# Patient Record
Sex: MALE | Race: BLACK OR AFRICAN AMERICAN | NOT HISPANIC OR LATINO | Employment: PART TIME | URBAN - METROPOLITAN AREA
[De-identification: names, ages, dates, MRNs, and addresses within clinical notes are randomized per-mention and may not be internally consistent; named-entity substitution may affect disease eponyms.]

---

## 2022-01-04 ENCOUNTER — IMMUNIZATION (OUTPATIENT)
Dept: PHARMACY | Facility: CLINIC | Age: 27
End: 2022-01-04
Payer: COMMERCIAL

## 2022-01-04 DIAGNOSIS — Z23 NEED FOR VACCINATION: Primary | ICD-10-CM

## 2022-03-04 ENCOUNTER — IMMUNIZATION (OUTPATIENT)
Dept: INTERNAL MEDICINE | Facility: CLINIC | Age: 27
End: 2022-03-04
Payer: COMMERCIAL

## 2022-03-04 PROCEDURE — 90686 IIV4 VACC NO PRSV 0.5 ML IM: CPT | Mod: S$GLB,,, | Performed by: PEDIATRICS

## 2022-03-04 PROCEDURE — 90471 FLU VACCINE (QUAD) GREATER THAN OR EQUAL TO 3YO PRESERVATIVE FREE IM: ICD-10-PCS | Mod: S$GLB,,, | Performed by: PEDIATRICS

## 2022-03-04 PROCEDURE — 90471 IMMUNIZATION ADMIN: CPT | Mod: S$GLB,,, | Performed by: PEDIATRICS

## 2022-03-04 PROCEDURE — 90686 FLU VACCINE (QUAD) GREATER THAN OR EQUAL TO 3YO PRESERVATIVE FREE IM: ICD-10-PCS | Mod: S$GLB,,, | Performed by: PEDIATRICS

## 2022-05-04 ENCOUNTER — HOSPITAL ENCOUNTER (OUTPATIENT)
Dept: RADIOLOGY | Facility: HOSPITAL | Age: 27
Discharge: HOME | End: 2022-05-04
Attending: FAMILY MEDICINE
Payer: COMMERCIAL

## 2022-05-04 ENCOUNTER — OFFICE VISIT (OUTPATIENT)
Dept: INTERNAL MEDICINE | Facility: CLINIC | Age: 27
End: 2022-05-04
Payer: COMMERCIAL

## 2022-05-04 VITALS
TEMPERATURE: 99 F | OXYGEN SATURATION: 99 % | HEIGHT: 72 IN | SYSTOLIC BLOOD PRESSURE: 142 MMHG | RESPIRATION RATE: 18 BRPM | HEART RATE: 99 BPM | DIASTOLIC BLOOD PRESSURE: 98 MMHG | WEIGHT: 315 LBS | BODY MASS INDEX: 42.66 KG/M2

## 2022-05-04 DIAGNOSIS — R23.8 OTHER SKIN CHANGES: ICD-10-CM

## 2022-05-04 DIAGNOSIS — F41.9 ANXIETY AND DEPRESSION: ICD-10-CM

## 2022-05-04 DIAGNOSIS — Z87.898 HISTORY OF PREDIABETES: ICD-10-CM

## 2022-05-04 DIAGNOSIS — Z00.00 ROUTINE PHYSICAL EXAMINATION: ICD-10-CM

## 2022-05-04 DIAGNOSIS — Z11.59 ENCOUNTER FOR HEPATITIS C SCREENING TEST FOR LOW RISK PATIENT: ICD-10-CM

## 2022-05-04 DIAGNOSIS — Z23 NEED FOR HPV VACCINATION: ICD-10-CM

## 2022-05-04 DIAGNOSIS — F90.9 ATTENTION DEFICIT HYPERACTIVITY DISORDER (ADHD), UNSPECIFIED ADHD TYPE: ICD-10-CM

## 2022-05-04 DIAGNOSIS — Z76.89 ESTABLISHING CARE WITH NEW DOCTOR, ENCOUNTER FOR: ICD-10-CM

## 2022-05-04 DIAGNOSIS — F32.A ANXIETY AND DEPRESSION: ICD-10-CM

## 2022-05-04 DIAGNOSIS — M25.569 KNEE PAIN, UNSPECIFIED CHRONICITY, UNSPECIFIED LATERALITY: ICD-10-CM

## 2022-05-04 DIAGNOSIS — Z11.4 ENCOUNTER FOR SCREENING FOR HIV: ICD-10-CM

## 2022-05-04 DIAGNOSIS — R03.0 ELEVATED BLOOD PRESSURE READING: ICD-10-CM

## 2022-05-04 DIAGNOSIS — M25.569 KNEE PAIN, UNSPECIFIED CHRONICITY, UNSPECIFIED LATERALITY: Primary | ICD-10-CM

## 2022-05-04 PROBLEM — J45.20 MILD INTERMITTENT ASTHMA, UNCOMPLICATED: Status: ACTIVE | Noted: 2022-05-04

## 2022-05-04 PROBLEM — F90.0 ATTENTION DEFICIT HYPERACTIVITY DISORDER (ADHD), PREDOMINANTLY INATTENTIVE TYPE: Status: ACTIVE | Noted: 2022-05-04

## 2022-05-04 PROCEDURE — 3008F PR BODY MASS INDEX (BMI) DOCUMENTED: ICD-10-PCS | Mod: CPTII,S$GLB,, | Performed by: FAMILY MEDICINE

## 2022-05-04 PROCEDURE — 73562 XR KNEE ORTHO BILAT: ICD-10-PCS | Mod: 26,50,, | Performed by: RADIOLOGY

## 2022-05-04 PROCEDURE — 3008F BODY MASS INDEX DOCD: CPT | Mod: CPTII,S$GLB,, | Performed by: FAMILY MEDICINE

## 2022-05-04 PROCEDURE — 90651 HPV VACCINE 9-VALENT 3 DOSE IM: ICD-10-PCS | Mod: S$GLB,,, | Performed by: FAMILY MEDICINE

## 2022-05-04 PROCEDURE — 99204 PR OFFICE/OUTPT VISIT, NEW, LEVL IV, 45-59 MIN: ICD-10-PCS | Mod: 25,S$GLB,, | Performed by: FAMILY MEDICINE

## 2022-05-04 PROCEDURE — 1159F PR MEDICATION LIST DOCUMENTED IN MEDICAL RECORD: ICD-10-PCS | Mod: CPTII,S$GLB,, | Performed by: FAMILY MEDICINE

## 2022-05-04 PROCEDURE — 73562 X-RAY EXAM OF KNEE 3: CPT | Mod: 26,50,, | Performed by: RADIOLOGY

## 2022-05-04 PROCEDURE — 99204 OFFICE O/P NEW MOD 45 MIN: CPT | Mod: 25,S$GLB,, | Performed by: FAMILY MEDICINE

## 2022-05-04 PROCEDURE — 99999 PR PBB SHADOW E&M-EST. PATIENT-LVL V: ICD-10-PCS | Mod: PBBFAC,,, | Performed by: FAMILY MEDICINE

## 2022-05-04 PROCEDURE — 90471 IMMUNIZATION ADMIN: CPT | Mod: S$GLB,,, | Performed by: FAMILY MEDICINE

## 2022-05-04 PROCEDURE — 99999 PR PBB SHADOW E&M-EST. PATIENT-LVL V: CPT | Mod: PBBFAC,,, | Performed by: FAMILY MEDICINE

## 2022-05-04 PROCEDURE — 73562 X-RAY EXAM OF KNEE 3: CPT | Mod: TC,50

## 2022-05-04 PROCEDURE — 3044F HG A1C LEVEL LT 7.0%: CPT | Mod: CPTII,S$GLB,, | Performed by: FAMILY MEDICINE

## 2022-05-04 PROCEDURE — 1159F MED LIST DOCD IN RCRD: CPT | Mod: CPTII,S$GLB,, | Performed by: FAMILY MEDICINE

## 2022-05-04 PROCEDURE — 3044F PR MOST RECENT HEMOGLOBIN A1C LEVEL <7.0%: ICD-10-PCS | Mod: CPTII,S$GLB,, | Performed by: FAMILY MEDICINE

## 2022-05-04 PROCEDURE — 90471 HPV VACCINE 9-VALENT 3 DOSE IM: ICD-10-PCS | Mod: S$GLB,,, | Performed by: FAMILY MEDICINE

## 2022-05-04 PROCEDURE — 90651 9VHPV VACCINE 2/3 DOSE IM: CPT | Mod: S$GLB,,, | Performed by: FAMILY MEDICINE

## 2022-05-04 RX ORDER — ATOMOXETINE 40 MG/1
40 CAPSULE ORAL DAILY
Qty: 30 CAPSULE | Refills: 0 | Status: SHIPPED | OUTPATIENT
Start: 2022-05-04 | End: 2022-10-24

## 2022-05-04 NOTE — PROGRESS NOTES
Subjective:       Patient ID: Ernie Hunter is a 26 y.o. male.    Chief Complaint: Establish Care    HPI Mr. Hunter presents today to establish care. He has a few concerns today      Stopped taking medication for ADHD     Diagnosed in elementary school   Stopped taking the medication because he did not like the way it made him feel    Feels he needs something again  Loses focus a lot   Working has been affected     Torn ligaments in the right knee  Never had surgery     Pain in left knee     Always had jobs that required a lot of moving     Depression and anxiety  Had 2 anxiety attacks on last year    Oldest of 4     Skin break out when he gets hot  Itchy   But back feels like it is on fire  Cooling down makes it feel better     Review of Systems   Constitutional: Negative for activity change, appetite change, fatigue and fever.   HENT: Negative for congestion, ear pain, facial swelling, hearing loss, sore throat and tinnitus.    Eyes: Negative for redness and visual disturbance.   Respiratory: Negative for cough, chest tightness and wheezing.    Gastrointestinal: Negative for abdominal distention, abdominal pain, constipation, diarrhea, nausea and vomiting.   Endocrine: Negative for polydipsia and polyuria.   Genitourinary: Negative for flank pain, frequency and penile discharge.   Musculoskeletal: Negative for back pain, gait problem and joint swelling.   Skin: Negative for rash.   Neurological: Negative for dizziness, tremors, seizures, weakness and headaches.   Psychiatric/Behavioral: Negative for agitation and confusion.           Past Medical History:   Diagnosis Date    Attention deficit hyperactivity disorder (ADHD), predominantly inattentive type     Mild intermittent asthma, uncomplicated     Prediabetes      History reviewed. No pertinent surgical history.  Family History   Problem Relation Age of Onset    Hypertension Mother     Migraines Mother     Depression Mother     Anxiety disorder  Mother     Hyperlipidemia Father     Hypertension Father     Heart attack Father         prior to 50    Heart failure Maternal Grandmother     Hypertension Maternal Grandmother     Hyperlipidemia Maternal Grandmother     Diabetes Maternal Grandmother     Diabetes Paternal Grandfather     Hyperlipidemia Paternal Grandfather     Hypertension Paternal Grandfather     Arthritis Paternal Grandfather     Gout Paternal Grandfather      Social History     Socioeconomic History    Marital status: Single   Tobacco Use    Smoking status: Never Smoker    Smokeless tobacco: Never Used   Substance and Sexual Activity    Alcohol use: Yes       Objective:        Physical Exam  Vitals reviewed.   Constitutional:       Appearance: He is obese. He is not ill-appearing.   HENT:      Head: Normocephalic and atraumatic.      Right Ear: External ear normal.      Left Ear: External ear normal.   Eyes:      General: No scleral icterus.        Right eye: No discharge.         Left eye: No discharge.      Pupils: Pupils are equal, round, and reactive to light.   Neck:      Thyroid: No thyromegaly.   Cardiovascular:      Rate and Rhythm: Normal rate and regular rhythm.      Heart sounds: Normal heart sounds. No murmur heard.  Pulmonary:      Effort: Pulmonary effort is normal. No respiratory distress.      Breath sounds: Normal breath sounds. No wheezing.   Abdominal:      General: Bowel sounds are normal. There is no distension.      Palpations: Abdomen is soft.      Tenderness: There is no abdominal tenderness.   Musculoskeletal:         General: Normal range of motion.      Cervical back: Normal range of motion and neck supple.   Skin:     General: Skin is warm.      Findings: No erythema or rash.   Neurological:      Mental Status: He is alert and oriented to person, place, and time.      Coordination: Coordination normal.      Deep Tendon Reflexes: Reflexes are normal and symmetric.   Psychiatric:         Behavior:  Behavior normal.           No results found for this or any previous visit.    Assessment/Plan:     Knee pain, unspecified chronicity, unspecified laterality  -     Cancel: X-Ray Knee 1 or 2 View Bilateral; Future; Expected date: 05/04/2022  -     Ambulatory referral/consult to Orthopedics; Future; Expected date: 05/11/2022    History of prediabetes  -     Hemoglobin A1C; Future; Expected date: 05/04/2022    Attention deficit hyperactivity disorder (ADHD), unspecified ADHD type  -     Cancel: Ambulatory referral/consult to Psychiatry; Future; Expected date: 05/11/2022  -     atomoxetine (STRATTERA) 40 MG capsule; Take 1 capsule (40 mg total) by mouth once daily.  Dispense: 30 capsule; Refill: 0  -     Ambulatory referral/consult to Psychiatry; Future; Expected date: 05/11/2022    Anxiety and depression  -     Ambulatory referral/consult to Psychiatry; Future; Expected date: 05/11/2022    Elevated blood pressure reading    Other skin changes  -     Ambulatory referral/consult to Dermatology; Future; Expected date: 05/11/2022    Need for HPV vaccination  -     (In Office Administered) HPV Vaccine (9-Valent) (3 Dose) (IM)    Establishing care with new doctor, encounter for    Encounter for hepatitis C screening test for low risk patient  -     Hepatitis C Antibody; Future; Expected date: 05/04/2022    Encounter for screening for HIV  -     HIV 1/2 Ag/Ab (4th Gen); Future; Expected date: 05/04/2022    Routine physical examination  -     Lipid Panel; Future; Expected date: 05/04/2022  -     Comprehensive Metabolic Panel; Future; Expected date: 05/04/2022  -     CBC Auto Differential; Future; Expected date: 05/04/2022  -     TSH; Future; Expected date: 05/04/2022          Follow up 2 weeks BP     Saba Palomares MD  ON   Family Medicine

## 2022-05-05 ENCOUNTER — PATIENT MESSAGE (OUTPATIENT)
Dept: INTERNAL MEDICINE | Facility: CLINIC | Age: 27
End: 2022-05-05
Payer: COMMERCIAL

## 2022-05-05 RX ORDER — METFORMIN HYDROCHLORIDE 500 MG/1
500 TABLET, EXTENDED RELEASE ORAL
Qty: 90 TABLET | Refills: 1 | Status: SHIPPED | OUTPATIENT
Start: 2022-05-05 | End: 2023-05-10 | Stop reason: SDUPTHER

## 2022-05-19 ENCOUNTER — PATIENT MESSAGE (OUTPATIENT)
Dept: INTERNAL MEDICINE | Facility: CLINIC | Age: 27
End: 2022-05-19
Payer: COMMERCIAL

## 2022-05-19 ENCOUNTER — TELEPHONE (OUTPATIENT)
Dept: INTERNAL MEDICINE | Facility: CLINIC | Age: 27
End: 2022-05-19
Payer: COMMERCIAL

## 2022-05-19 DIAGNOSIS — M25.569 KNEE PAIN, UNSPECIFIED CHRONICITY, UNSPECIFIED LATERALITY: Primary | ICD-10-CM

## 2022-05-19 NOTE — TELEPHONE ENCOUNTER
----- Message from Maurizio Phillips sent at 5/19/2022  3:23 PM CDT -----  Contact: PT  Calling to give the Ortho providers to send a referral to. Provider name is Dr. Marie at the [facility] at [address] [place] [address]. Call back at [phone]

## 2022-05-27 ENCOUNTER — PATIENT MESSAGE (OUTPATIENT)
Dept: INTERNAL MEDICINE | Facility: CLINIC | Age: 27
End: 2022-05-27
Payer: COMMERCIAL

## 2022-05-31 ENCOUNTER — OFFICE VISIT (OUTPATIENT)
Dept: DERMATOLOGY | Facility: CLINIC | Age: 27
End: 2022-05-31
Payer: COMMERCIAL

## 2022-05-31 DIAGNOSIS — L73.9 FOLLICULITIS: ICD-10-CM

## 2022-05-31 DIAGNOSIS — L85.8 KERATOSIS PILARIS: Primary | ICD-10-CM

## 2022-05-31 DIAGNOSIS — R23.8 OTHER SKIN CHANGES: ICD-10-CM

## 2022-05-31 PROCEDURE — 1160F PR REVIEW ALL MEDS BY PRESCRIBER/CLIN PHARMACIST DOCUMENTED: ICD-10-PCS | Mod: CPTII,S$GLB,, | Performed by: STUDENT IN AN ORGANIZED HEALTH CARE EDUCATION/TRAINING PROGRAM

## 2022-05-31 PROCEDURE — 3044F PR MOST RECENT HEMOGLOBIN A1C LEVEL <7.0%: ICD-10-PCS | Mod: CPTII,S$GLB,, | Performed by: STUDENT IN AN ORGANIZED HEALTH CARE EDUCATION/TRAINING PROGRAM

## 2022-05-31 PROCEDURE — 1159F PR MEDICATION LIST DOCUMENTED IN MEDICAL RECORD: ICD-10-PCS | Mod: CPTII,S$GLB,, | Performed by: STUDENT IN AN ORGANIZED HEALTH CARE EDUCATION/TRAINING PROGRAM

## 2022-05-31 PROCEDURE — 1159F MED LIST DOCD IN RCRD: CPT | Mod: CPTII,S$GLB,, | Performed by: STUDENT IN AN ORGANIZED HEALTH CARE EDUCATION/TRAINING PROGRAM

## 2022-05-31 PROCEDURE — 99204 PR OFFICE/OUTPT VISIT, NEW, LEVL IV, 45-59 MIN: ICD-10-PCS | Mod: S$GLB,,, | Performed by: STUDENT IN AN ORGANIZED HEALTH CARE EDUCATION/TRAINING PROGRAM

## 2022-05-31 PROCEDURE — 1160F RVW MEDS BY RX/DR IN RCRD: CPT | Mod: CPTII,S$GLB,, | Performed by: STUDENT IN AN ORGANIZED HEALTH CARE EDUCATION/TRAINING PROGRAM

## 2022-05-31 PROCEDURE — 99999 PR PBB SHADOW E&M-EST. PATIENT-LVL III: CPT | Mod: PBBFAC,,, | Performed by: STUDENT IN AN ORGANIZED HEALTH CARE EDUCATION/TRAINING PROGRAM

## 2022-05-31 PROCEDURE — 99999 PR PBB SHADOW E&M-EST. PATIENT-LVL III: ICD-10-PCS | Mod: PBBFAC,,, | Performed by: STUDENT IN AN ORGANIZED HEALTH CARE EDUCATION/TRAINING PROGRAM

## 2022-05-31 PROCEDURE — 99204 OFFICE O/P NEW MOD 45 MIN: CPT | Mod: S$GLB,,, | Performed by: STUDENT IN AN ORGANIZED HEALTH CARE EDUCATION/TRAINING PROGRAM

## 2022-05-31 PROCEDURE — 3044F HG A1C LEVEL LT 7.0%: CPT | Mod: CPTII,S$GLB,, | Performed by: STUDENT IN AN ORGANIZED HEALTH CARE EDUCATION/TRAINING PROGRAM

## 2022-05-31 RX ORDER — TRIAMCINOLONE ACETONIDE 1 MG/G
CREAM TOPICAL
Qty: 454 G | Refills: 0 | Status: SHIPPED | OUTPATIENT
Start: 2022-05-31

## 2022-05-31 RX ORDER — UREA 40 %
CREAM (GRAM) TOPICAL DAILY
Qty: 85 G | Refills: 2 | Status: SHIPPED | OUTPATIENT
Start: 2022-05-31

## 2022-05-31 RX ORDER — DOXYCYCLINE HYCLATE 100 MG
100 TABLET ORAL DAILY
Qty: 30 TABLET | Refills: 0 | Status: SHIPPED | OUTPATIENT
Start: 2022-05-31 | End: 2022-10-24

## 2022-05-31 NOTE — PROGRESS NOTES
Subjective:       Patient ID:  Ernie Hunter is a 26 y.o. male who presents for   Chief Complaint   Patient presents with    Skin Check     History of Present Illness: The patient presents with chief complaint of a rough, bumpy, rash on the skin.  Location: chest, arms, upper back  Duration: ongoing for months  Signs/Symptoms: rough, bumpy itchy skin in these areas. Also develops pus bumps and red bumps often  Prior treatments: none        Review of Systems   Constitutional: Negative for fever and chills.   Skin: Positive for itching and dry skin.        Objective:    Physical Exam   Constitutional: He appears well-developed and well-nourished. No distress.   Neurological: He is alert and oriented to person, place, and time. He is not disoriented.   Psychiatric: He has a normal mood and affect.   Skin:   Areas Examined (abnormalities noted in diagram):   Head / Face Inspection Performed  Neck Inspection Performed  Chest / Axilla Inspection Performed  Abdomen Inspection Performed  Back Inspection Performed  RUE Inspected  LUE Inspection Performed              Diagram Legend     Erythematous scaling macule/papule c/w actinic keratosis       Vascular papule c/w angioma      Pigmented verrucoid papule/plaque c/w seborrheic keratosis      Yellow umbilicated papule c/w sebaceous hyperplasia      Irregularly shaped tan macule c/w lentigo     1-2 mm smooth white papules consistent with Milia      Movable subcutaneous cyst with punctum c/w epidermal inclusion cyst      Subcutaneous movable cyst c/w pilar cyst      Firm pink to brown papule c/w dermatofibroma      Pedunculated fleshy papule(s) c/w skin tag(s)      Evenly pigmented macule c/w junctional nevus     Mildly variegated pigmented, slightly irregular-bordered macule c/w mildly atypical nevus      Flesh colored to evenly pigmented papule c/w intradermal nevus       Pink pearly papule/plaque c/w basal cell carcinoma      Erythematous hyperkeratotic cursted  plaque c/w SCC      Surgical scar with no sign of skin cancer recurrence      Open and closed comedones      Inflammatory papules and pustules      Verrucoid papule consistent consistent with wart     Erythematous eczematous patches and plaques     Dystrophic onycholytic nail with subungual debris c/w onychomycosis     Umbilicated papule    Erythematous-base heme-crusted tan verrucoid plaque consistent with inflamed seborrheic keratosis     Erythematous Silvery Scaling Plaque c/w Psoriasis     See annotation      Assessment / Plan:        Keratosis pilaris  -     triamcinolone acetonide 0.1% (KENALOG) 0.1 % cream; AAA bid  Dispense: 454 g; Refill: 0  -     urea (CARMOL) 40 % Crea; Apply topically once daily.  Dispense: 85 g; Refill: 2  -     Counseled patient on gentle skin care regimen, including need for sensitive soaps/detergents, as well as need for frequent use of sensitize moisturizers.     Folliculitis  -     doxycycline (VIBRA-TABS) 100 MG tablet; Take 1 tablet (100 mg total) by mouth once daily.  Dispense: 30 tablet; Refill: 0             Follow up in about 8 weeks (around 7/26/2022).

## 2022-06-06 ENCOUNTER — OFFICE VISIT (OUTPATIENT)
Dept: PSYCHIATRY | Facility: CLINIC | Age: 27
End: 2022-06-06
Payer: COMMERCIAL

## 2022-06-06 ENCOUNTER — PATIENT MESSAGE (OUTPATIENT)
Dept: DERMATOLOGY | Facility: CLINIC | Age: 27
End: 2022-06-06
Payer: COMMERCIAL

## 2022-06-06 DIAGNOSIS — F32.A ANXIETY AND DEPRESSION: ICD-10-CM

## 2022-06-06 DIAGNOSIS — F41.9 ANXIETY AND DEPRESSION: ICD-10-CM

## 2022-06-06 PROCEDURE — 90832 PSYTX W PT 30 MINUTES: CPT | Mod: S$GLB,,, | Performed by: SOCIAL WORKER

## 2022-06-06 PROCEDURE — 3044F HG A1C LEVEL LT 7.0%: CPT | Mod: CPTII,S$GLB,, | Performed by: SOCIAL WORKER

## 2022-06-06 PROCEDURE — 99999 PR PBB SHADOW E&M-EST. PATIENT-LVL I: ICD-10-PCS | Mod: PBBFAC,,, | Performed by: SOCIAL WORKER

## 2022-06-06 PROCEDURE — 90832 PR PSYCHOTHERAPY W/PATIENT, 30 MIN: ICD-10-PCS | Mod: S$GLB,,, | Performed by: SOCIAL WORKER

## 2022-06-06 PROCEDURE — 3044F PR MOST RECENT HEMOGLOBIN A1C LEVEL <7.0%: ICD-10-PCS | Mod: CPTII,S$GLB,, | Performed by: SOCIAL WORKER

## 2022-06-06 PROCEDURE — 99999 PR PBB SHADOW E&M-EST. PATIENT-LVL I: CPT | Mod: PBBFAC,,, | Performed by: SOCIAL WORKER
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
## 2022-06-14 ENCOUNTER — PATIENT MESSAGE (OUTPATIENT)
Dept: INTERNAL MEDICINE | Facility: CLINIC | Age: 27
End: 2022-06-14
Payer: COMMERCIAL

## 2022-06-15 ENCOUNTER — OFFICE VISIT (OUTPATIENT)
Dept: INTERNAL MEDICINE | Facility: CLINIC | Age: 27
End: 2022-06-15
Payer: COMMERCIAL

## 2022-06-15 ENCOUNTER — PATIENT MESSAGE (OUTPATIENT)
Dept: INTERNAL MEDICINE | Facility: CLINIC | Age: 27
End: 2022-06-15

## 2022-06-15 DIAGNOSIS — F41.9 ANXIETY AND DEPRESSION: Primary | ICD-10-CM

## 2022-06-15 DIAGNOSIS — F32.A ANXIETY AND DEPRESSION: Primary | ICD-10-CM

## 2022-06-15 PROCEDURE — 3044F HG A1C LEVEL LT 7.0%: CPT | Mod: CPTII,95,, | Performed by: FAMILY MEDICINE

## 2022-06-15 PROCEDURE — 99213 OFFICE O/P EST LOW 20 MIN: CPT | Mod: 95,,, | Performed by: FAMILY MEDICINE

## 2022-06-15 PROCEDURE — 3044F PR MOST RECENT HEMOGLOBIN A1C LEVEL <7.0%: ICD-10-PCS | Mod: CPTII,95,, | Performed by: FAMILY MEDICINE

## 2022-06-15 PROCEDURE — 99213 PR OFFICE/OUTPT VISIT, EST, LEVL III, 20-29 MIN: ICD-10-PCS | Mod: 95,,, | Performed by: FAMILY MEDICINE

## 2022-06-15 NOTE — PROGRESS NOTES
The patient location is: [place] ([place])  The chief complaint leading to consultation is: follow up     Visit type: audiovisual    Face to Face time with patient: 14 minutes  14 minutes of total time spent on the encounter, which includes face to face time and non-face to face time preparing to see the patient (eg, review of tests), Obtaining and/or reviewing separately obtained history, Documenting clinical information in the electronic or other health record, Independently interpreting results (not separately reported) and communicating results to the patient/family/caregiver, or Care coordination (not separately reported).         Each patient to whom he or she provides medical services by telemedicine is:  (1) informed of the relationship between the physician and patient and the respective role of any other health care provider with respect to management of the patient; and (2) notified that he or she may decline to receive medical services by telemedicine and may withdraw from such care at any time.    Notes:   Subjective:       Patient ID: Ernie Hunter is a 26 y.o. male.    Chief Complaint: No chief complaint on file.    HPI Mr. Hunter presents today via telemedicine for follow up.     Previous appt was to establish care  Anxiety addressed   Referral to psychiatry made at that time   He has had a visit with Omar in the psych department   He would like to also see an MD     Attention deficit addressed   STrattera was prescribed he did not see a difference after 15 days 80 mg   He is scheduled in July 8th     Review of Systems   Constitutional: Negative for activity change.   HENT: Negative for hearing loss and trouble swallowing.    Eyes: Negative for discharge.   Respiratory: Negative for chest tightness and wheezing.    Cardiovascular: Negative for chest pain and palpitations.   Gastrointestinal: Negative for constipation, diarrhea and vomiting.   Genitourinary: Negative for difficulty urinating  and hematuria.   Neurological: Positive for headaches. Negative for weakness.   Psychiatric/Behavioral: Positive for dysphoric mood. Negative for confusion.           Past Medical History:   Diagnosis Date    Attention deficit hyperactivity disorder (ADHD), predominantly inattentive type     Mild intermittent asthma, uncomplicated     Prediabetes      No past surgical history on file.  Family History   Problem Relation Age of Onset    Hypertension Mother     Migraines Mother     Depression Mother     Anxiety disorder Mother     Hyperlipidemia Father     Hypertension Father     Heart attack Father         prior to 50    Heart failure Maternal Grandmother     Hypertension Maternal Grandmother     Hyperlipidemia Maternal Grandmother     Diabetes Maternal Grandmother     Diabetes Paternal Grandfather     Hyperlipidemia Paternal Grandfather     Hypertension Paternal Grandfather     Arthritis Paternal Grandfather     Gout Paternal Grandfather      Social History     Socioeconomic History    Marital status: Single   Tobacco Use    Smoking status: Never Smoker    Smokeless tobacco: Never Used   Substance and Sexual Activity    Alcohol use: Yes       Objective:        Physical Exam  Constitutional:       Appearance: Normal appearance. He is obese.   HENT:      Head: Normocephalic and atraumatic.   Pulmonary:      Effort: Pulmonary effort is normal.   Neurological:      Mental Status: He is alert and oriented to person, place, and time.           Results for orders placed or performed in visit on 05/04/22   Lipid Panel   Result Value Ref Range    Cholesterol 172 120 - 199 mg/dL    Triglycerides 44 30 - 150 mg/dL    HDL 38 (L) 40 - 75 mg/dL    LDL Cholesterol 125.2 63.0 - 159.0 mg/dL    HDL/Cholesterol Ratio 22.1 20.0 - 50.0 %    Total Cholesterol/HDL Ratio 4.5 2.0 - 5.0    Non-HDL Cholesterol 134 mg/dL   Comprehensive Metabolic Panel   Result Value Ref Range    Sodium 140 136 - 145 mmol/L    Potassium  4.2 3.5 - 5.1 mmol/L    Chloride 106 95 - 110 mmol/L    CO2 26 23 - 29 mmol/L    Glucose 102 70 - 110 mg/dL    BUN 8 6 - 20 mg/dL    Creatinine 0.9 0.5 - 1.4 mg/dL    Calcium 9.9 8.7 - 10.5 mg/dL    Total Protein 7.2 6.0 - 8.4 g/dL    Albumin 4.1 3.5 - 5.2 g/dL    Total Bilirubin 1.0 0.1 - 1.0 mg/dL    Alkaline Phosphatase 71 55 - 135 U/L    AST 23 10 - 40 U/L    ALT 36 10 - 44 U/L    Anion Gap 8 8 - 16 mmol/L    eGFR if African American >60.0 >60 mL/min/1.73 m^2    eGFR if non African American >60.0 >60 mL/min/1.73 m^2   CBC Auto Differential   Result Value Ref Range    WBC 5.26 3.90 - 12.70 K/uL    RBC 5.72 4.60 - 6.20 M/uL    Hemoglobin 16.4 14.0 - 18.0 g/dL    Hematocrit 48.8 40.0 - 54.0 %    MCV 85 82 - 98 fL    MCH 28.7 27.0 - 31.0 pg    MCHC 33.6 32.0 - 36.0 g/dL    RDW 13.2 11.5 - 14.5 %    Platelets 303 150 - 450 K/uL    MPV 11.7 9.2 - 12.9 fL    Immature Granulocytes 0.2 0.0 - 0.5 %    Gran # (ANC) 2.7 1.8 - 7.7 K/uL    Immature Grans (Abs) 0.01 0.00 - 0.04 K/uL    Lymph # 1.8 1.0 - 4.8 K/uL    Mono # 0.6 0.3 - 1.0 K/uL    Eos # 0.2 0.0 - 0.5 K/uL    Baso # 0.03 0.00 - 0.20 K/uL    nRBC 0 0 /100 WBC    Gran % 52.0 38.0 - 73.0 %    Lymph % 33.3 18.0 - 48.0 %    Mono % 10.5 4.0 - 15.0 %    Eosinophil % 3.4 0.0 - 8.0 %    Basophil % 0.6 0.0 - 1.9 %    Differential Method Automated    TSH   Result Value Ref Range    TSH 1.493 0.400 - 4.000 uIU/mL   Hepatitis C Antibody   Result Value Ref Range    Hepatitis C Ab Negative Negative   HIV 1/2 Ag/Ab (4th Gen)   Result Value Ref Range    HIV 1/2 Ag/Ab Negative Negative   Hemoglobin A1C   Result Value Ref Range    Hemoglobin A1C 6.0 (H) 4.0 - 5.6 %    Estimated Avg Glucose 126 68 - 131 mg/dL       Assessment/Plan:     Anxiety and depression  -     Ambulatory referral/consult to Psychiatry; Future; Expected date: 06/22/2022      Check ortho external referral  Pt scheduled to see someone in July for evaluation for stimulant medication     Follow up as needed      Saba Palmoares MD  [facility]

## 2022-07-08 ENCOUNTER — OFFICE VISIT (OUTPATIENT)
Dept: PSYCHIATRY | Facility: CLINIC | Age: 27
End: 2022-07-08
Payer: COMMERCIAL

## 2022-07-08 DIAGNOSIS — F32.A ANXIETY AND DEPRESSION: Primary | ICD-10-CM

## 2022-07-08 DIAGNOSIS — F41.9 ANXIETY AND DEPRESSION: Primary | ICD-10-CM

## 2022-07-08 PROCEDURE — 3044F PR MOST RECENT HEMOGLOBIN A1C LEVEL <7.0%: ICD-10-PCS | Mod: CPTII,S$GLB,, | Performed by: SOCIAL WORKER

## 2022-07-08 PROCEDURE — 3044F HG A1C LEVEL LT 7.0%: CPT | Mod: CPTII,S$GLB,, | Performed by: SOCIAL WORKER

## 2022-07-08 PROCEDURE — 90834 PSYTX W PT 45 MINUTES: CPT | Mod: S$GLB,,, | Performed by: SOCIAL WORKER

## 2022-07-08 PROCEDURE — 90834 PR PSYCHOTHERAPY W/PATIENT, 45 MIN: ICD-10-PCS | Mod: S$GLB,,, | Performed by: SOCIAL WORKER

## 2022-07-13 ENCOUNTER — OFFICE VISIT (OUTPATIENT)
Dept: PSYCHIATRY | Facility: CLINIC | Age: 27
End: 2022-07-13
Payer: COMMERCIAL

## 2022-07-13 DIAGNOSIS — F41.9 ANXIETY AND DEPRESSION: Primary | ICD-10-CM

## 2022-07-13 DIAGNOSIS — F32.A ANXIETY AND DEPRESSION: Primary | ICD-10-CM

## 2022-07-13 PROCEDURE — 90834 PSYTX W PT 45 MINUTES: CPT | Mod: S$GLB,,, | Performed by: SOCIAL WORKER

## 2022-07-13 PROCEDURE — 3044F PR MOST RECENT HEMOGLOBIN A1C LEVEL <7.0%: ICD-10-PCS | Mod: CPTII,S$GLB,, | Performed by: SOCIAL WORKER

## 2022-07-13 PROCEDURE — 90834 PR PSYCHOTHERAPY W/PATIENT, 45 MIN: ICD-10-PCS | Mod: S$GLB,,, | Performed by: SOCIAL WORKER

## 2022-07-13 PROCEDURE — 3044F HG A1C LEVEL LT 7.0%: CPT | Mod: CPTII,S$GLB,, | Performed by: SOCIAL WORKER
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
## 2022-07-26 ENCOUNTER — OFFICE VISIT (OUTPATIENT)
Dept: DERMATOLOGY | Facility: CLINIC | Age: 27
End: 2022-07-26
Payer: COMMERCIAL

## 2022-07-26 DIAGNOSIS — L85.8 KERATOSIS PILARIS: Primary | ICD-10-CM

## 2022-07-26 DIAGNOSIS — L73.9 FOLLICULITIS: ICD-10-CM

## 2022-07-26 PROCEDURE — 1160F RVW MEDS BY RX/DR IN RCRD: CPT | Mod: CPTII,S$GLB,, | Performed by: STUDENT IN AN ORGANIZED HEALTH CARE EDUCATION/TRAINING PROGRAM

## 2022-07-26 PROCEDURE — 3044F HG A1C LEVEL LT 7.0%: CPT | Mod: CPTII,S$GLB,, | Performed by: STUDENT IN AN ORGANIZED HEALTH CARE EDUCATION/TRAINING PROGRAM

## 2022-07-26 PROCEDURE — 99999 PR PBB SHADOW E&M-EST. PATIENT-LVL III: CPT | Mod: PBBFAC,,, | Performed by: STUDENT IN AN ORGANIZED HEALTH CARE EDUCATION/TRAINING PROGRAM

## 2022-07-26 PROCEDURE — 1159F MED LIST DOCD IN RCRD: CPT | Mod: CPTII,S$GLB,, | Performed by: STUDENT IN AN ORGANIZED HEALTH CARE EDUCATION/TRAINING PROGRAM

## 2022-07-26 PROCEDURE — 1159F PR MEDICATION LIST DOCUMENTED IN MEDICAL RECORD: ICD-10-PCS | Mod: CPTII,S$GLB,, | Performed by: STUDENT IN AN ORGANIZED HEALTH CARE EDUCATION/TRAINING PROGRAM

## 2022-07-26 PROCEDURE — 3044F PR MOST RECENT HEMOGLOBIN A1C LEVEL <7.0%: ICD-10-PCS | Mod: CPTII,S$GLB,, | Performed by: STUDENT IN AN ORGANIZED HEALTH CARE EDUCATION/TRAINING PROGRAM

## 2022-07-26 PROCEDURE — 1160F PR REVIEW ALL MEDS BY PRESCRIBER/CLIN PHARMACIST DOCUMENTED: ICD-10-PCS | Mod: CPTII,S$GLB,, | Performed by: STUDENT IN AN ORGANIZED HEALTH CARE EDUCATION/TRAINING PROGRAM

## 2022-07-26 PROCEDURE — 99213 PR OFFICE/OUTPT VISIT, EST, LEVL III, 20-29 MIN: ICD-10-PCS | Mod: S$GLB,,, | Performed by: STUDENT IN AN ORGANIZED HEALTH CARE EDUCATION/TRAINING PROGRAM

## 2022-07-26 PROCEDURE — 99213 OFFICE O/P EST LOW 20 MIN: CPT | Mod: S$GLB,,, | Performed by: STUDENT IN AN ORGANIZED HEALTH CARE EDUCATION/TRAINING PROGRAM

## 2022-07-26 PROCEDURE — 99999 PR PBB SHADOW E&M-EST. PATIENT-LVL III: ICD-10-PCS | Mod: PBBFAC,,, | Performed by: STUDENT IN AN ORGANIZED HEALTH CARE EDUCATION/TRAINING PROGRAM

## 2022-07-26 RX ORDER — UREA 40 %
CREAM (GRAM) TOPICAL 2 TIMES DAILY
Qty: 85 G | Refills: 0 | Status: SHIPPED | OUTPATIENT
Start: 2022-07-26

## 2022-07-26 NOTE — PROGRESS NOTES
Subjective:       Patient ID:  Ernie Hunter is a 27 y.o. male who presents for   Chief Complaint   Patient presents with    Follow-up     History of Present Illness: The patient presents for follow up of keratosis pilaris and folliculitis, last seen on 5/31/22 where he was started on trial of doxycycline, TAC and attempted to get urea cream. Patient only received doxycycline and TAC cream. Reports improvement with redness and pus bumps, as well as itching. Still does though report rough, bumpy skin on the arms and shoulders. No other concerning rash or skin lesions.         Review of Systems   Constitutional: Negative for fever and chills.        Objective:    Physical Exam   Constitutional: He appears well-developed and well-nourished. No distress.   Neurological: He is alert and oriented to person, place, and time. He is not disoriented.   Psychiatric: He has a normal mood and affect.   Skin:   Areas Examined (abnormalities noted in diagram):   Head / Face Inspection Performed  Neck Inspection Performed  Chest / Axilla Inspection Performed  Abdomen Inspection Performed  Back Inspection Performed  RUE Inspected  LUE Inspection Performed  RLE Inspected              Diagram Legend     Erythematous scaling macule/papule c/w actinic keratosis       Vascular papule c/w angioma      Pigmented verrucoid papule/plaque c/w seborrheic keratosis      Yellow umbilicated papule c/w sebaceous hyperplasia      Irregularly shaped tan macule c/w lentigo     1-2 mm smooth white papules consistent with Milia      Movable subcutaneous cyst with punctum c/w epidermal inclusion cyst      Subcutaneous movable cyst c/w pilar cyst      Firm pink to brown papule c/w dermatofibroma      Pedunculated fleshy papule(s) c/w skin tag(s)      Evenly pigmented macule c/w junctional nevus     Mildly variegated pigmented, slightly irregular-bordered macule c/w mildly atypical nevus      Flesh colored to evenly pigmented papule c/w  intradermal nevus       Pink pearly papule/plaque c/w basal cell carcinoma      Erythematous hyperkeratotic cursted plaque c/w SCC      Surgical scar with no sign of skin cancer recurrence      Open and closed comedones      Inflammatory papules and pustules      Verrucoid papule consistent consistent with wart     Erythematous eczematous patches and plaques     Dystrophic onycholytic nail with subungual debris c/w onychomycosis     Umbilicated papule    Erythematous-base heme-crusted tan verrucoid plaque consistent with inflamed seborrheic keratosis     Erythematous Silvery Scaling Plaque c/w Psoriasis     See annotation      Assessment / Plan:        Keratosis pilaris/Folliculitis - improvement s/p doxycycline and topical steroids; still with hyperkeratotic papules. Will resend for urea cream. TAC only as needed for any itching.   -     urea (CARMOL) 40 % Crea; Apply topically 2 (two) times daily.  Dispense: 85 g; Refill: 2           Follow up in about 6 months (around 1/26/2023), or if symptoms worsen or fail to improve.

## 2022-07-28 ENCOUNTER — OFFICE VISIT (OUTPATIENT)
Dept: PSYCHIATRY | Facility: CLINIC | Age: 27
End: 2022-07-28
Payer: COMMERCIAL

## 2022-07-28 ENCOUNTER — PATIENT MESSAGE (OUTPATIENT)
Dept: DERMATOLOGY | Facility: CLINIC | Age: 27
End: 2022-07-28
Payer: COMMERCIAL

## 2022-07-28 DIAGNOSIS — F41.9 ANXIETY AND DEPRESSION: Primary | ICD-10-CM

## 2022-07-28 DIAGNOSIS — F32.A ANXIETY AND DEPRESSION: Primary | ICD-10-CM

## 2022-07-28 PROCEDURE — 90834 PSYTX W PT 45 MINUTES: CPT | Mod: S$GLB,,, | Performed by: SOCIAL WORKER

## 2022-07-28 PROCEDURE — 3044F HG A1C LEVEL LT 7.0%: CPT | Mod: CPTII,S$GLB,, | Performed by: SOCIAL WORKER

## 2022-07-28 PROCEDURE — 3044F PR MOST RECENT HEMOGLOBIN A1C LEVEL <7.0%: ICD-10-PCS | Mod: CPTII,S$GLB,, | Performed by: SOCIAL WORKER

## 2022-07-28 PROCEDURE — 90834 PR PSYCHOTHERAPY W/PATIENT, 45 MIN: ICD-10-PCS | Mod: S$GLB,,, | Performed by: SOCIAL WORKER
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
## 2022-08-01 ENCOUNTER — TELEPHONE (OUTPATIENT)
Dept: PHARMACY | Facility: CLINIC | Age: 27
End: 2022-08-01
Payer: COMMERCIAL

## 2022-08-02 ENCOUNTER — PATIENT MESSAGE (OUTPATIENT)
Dept: DERMATOLOGY | Facility: CLINIC | Age: 27
End: 2022-08-02
Payer: COMMERCIAL

## 2022-09-08 ENCOUNTER — OFFICE VISIT (OUTPATIENT)
Dept: PSYCHIATRY | Facility: CLINIC | Age: 27
End: 2022-09-08
Payer: COMMERCIAL

## 2022-09-08 DIAGNOSIS — F41.9 ANXIETY AND DEPRESSION: Primary | ICD-10-CM

## 2022-09-08 DIAGNOSIS — F32.A ANXIETY AND DEPRESSION: Primary | ICD-10-CM

## 2022-09-08 PROCEDURE — 90832 PR PSYCHOTHERAPY W/PATIENT, 30 MIN: ICD-10-PCS | Mod: S$GLB,,, | Performed by: SOCIAL WORKER

## 2022-09-08 PROCEDURE — 3044F HG A1C LEVEL LT 7.0%: CPT | Mod: CPTII,S$GLB,, | Performed by: SOCIAL WORKER

## 2022-09-08 PROCEDURE — 3044F PR MOST RECENT HEMOGLOBIN A1C LEVEL <7.0%: ICD-10-PCS | Mod: CPTII,S$GLB,, | Performed by: SOCIAL WORKER

## 2022-09-08 PROCEDURE — 90832 PSYTX W PT 30 MINUTES: CPT | Mod: S$GLB,,, | Performed by: SOCIAL WORKER

## 2022-09-13 ENCOUNTER — HOSPITAL ENCOUNTER (OUTPATIENT)
Dept: RADIOLOGY | Facility: HOSPITAL | Age: 27
Discharge: HOME | End: 2022-09-13
Attending: FAMILY MEDICINE
Payer: COMMERCIAL

## 2022-09-13 ENCOUNTER — OFFICE VISIT (OUTPATIENT)
Dept: INTERNAL MEDICINE | Facility: CLINIC | Age: 27
End: 2022-09-13
Payer: COMMERCIAL

## 2022-09-13 VITALS
OXYGEN SATURATION: 99 % | DIASTOLIC BLOOD PRESSURE: 78 MMHG | RESPIRATION RATE: 18 BRPM | SYSTOLIC BLOOD PRESSURE: 116 MMHG | BODY MASS INDEX: 42.66 KG/M2 | HEART RATE: 90 BPM | TEMPERATURE: 98 F | WEIGHT: 315 LBS | HEIGHT: 72 IN

## 2022-09-13 DIAGNOSIS — M25.642 STIFFNESS OF LEFT HAND JOINT: ICD-10-CM

## 2022-09-13 DIAGNOSIS — R73.03 PREDIABETES: ICD-10-CM

## 2022-09-13 DIAGNOSIS — G47.30 SLEEP APNEA, UNSPECIFIED TYPE: ICD-10-CM

## 2022-09-13 DIAGNOSIS — M79.641 PAIN IN BOTH HANDS: Primary | ICD-10-CM

## 2022-09-13 DIAGNOSIS — H91.90 HEARING LOSS, UNSPECIFIED HEARING LOSS TYPE, UNSPECIFIED LATERALITY: ICD-10-CM

## 2022-09-13 DIAGNOSIS — M79.642 PAIN IN BOTH HANDS: Primary | ICD-10-CM

## 2022-09-13 DIAGNOSIS — M79.641 PAIN IN BOTH HANDS: ICD-10-CM

## 2022-09-13 DIAGNOSIS — Z23 NEED FOR HPV VACCINATION: ICD-10-CM

## 2022-09-13 DIAGNOSIS — M19.90 ARTHRITIS: ICD-10-CM

## 2022-09-13 DIAGNOSIS — M79.642 PAIN IN BOTH HANDS: ICD-10-CM

## 2022-09-13 PROCEDURE — 99214 OFFICE O/P EST MOD 30 MIN: CPT | Mod: 25,S$GLB,, | Performed by: FAMILY MEDICINE

## 2022-09-13 PROCEDURE — 90651 9VHPV VACCINE 2/3 DOSE IM: CPT | Mod: S$GLB,,, | Performed by: FAMILY MEDICINE

## 2022-09-13 PROCEDURE — 3074F SYST BP LT 130 MM HG: CPT | Mod: CPTII,S$GLB,, | Performed by: FAMILY MEDICINE

## 2022-09-13 PROCEDURE — 90651 HPV VACCINE 9-VALENT 3 DOSE IM: ICD-10-PCS | Mod: S$GLB,,, | Performed by: FAMILY MEDICINE

## 2022-09-13 PROCEDURE — 1159F PR MEDICATION LIST DOCUMENTED IN MEDICAL RECORD: ICD-10-PCS | Mod: CPTII,S$GLB,, | Performed by: FAMILY MEDICINE

## 2022-09-13 PROCEDURE — 3078F PR MOST RECENT DIASTOLIC BLOOD PRESSURE < 80 MM HG: ICD-10-PCS | Mod: CPTII,S$GLB,, | Performed by: FAMILY MEDICINE

## 2022-09-13 PROCEDURE — 3008F PR BODY MASS INDEX (BMI) DOCUMENTED: ICD-10-PCS | Mod: CPTII,S$GLB,, | Performed by: FAMILY MEDICINE

## 2022-09-13 PROCEDURE — 3044F PR MOST RECENT HEMOGLOBIN A1C LEVEL <7.0%: ICD-10-PCS | Mod: CPTII,S$GLB,, | Performed by: FAMILY MEDICINE

## 2022-09-13 PROCEDURE — 99999 PR PBB SHADOW E&M-EST. PATIENT-LVL V: ICD-10-PCS | Mod: PBBFAC,,, | Performed by: FAMILY MEDICINE

## 2022-09-13 PROCEDURE — 73130 X-RAY EXAM OF HAND: CPT | Mod: TC,50

## 2022-09-13 PROCEDURE — 99214 PR OFFICE/OUTPT VISIT, EST, LEVL IV, 30-39 MIN: ICD-10-PCS | Mod: 25,S$GLB,, | Performed by: FAMILY MEDICINE

## 2022-09-13 PROCEDURE — 3008F BODY MASS INDEX DOCD: CPT | Mod: CPTII,S$GLB,, | Performed by: FAMILY MEDICINE

## 2022-09-13 PROCEDURE — 90471 HPV VACCINE 9-VALENT 3 DOSE IM: ICD-10-PCS | Mod: S$GLB,,, | Performed by: FAMILY MEDICINE

## 2022-09-13 PROCEDURE — 3074F PR MOST RECENT SYSTOLIC BLOOD PRESSURE < 130 MM HG: ICD-10-PCS | Mod: CPTII,S$GLB,, | Performed by: FAMILY MEDICINE

## 2022-09-13 PROCEDURE — 3044F HG A1C LEVEL LT 7.0%: CPT | Mod: CPTII,S$GLB,, | Performed by: FAMILY MEDICINE

## 2022-09-13 PROCEDURE — 73130 XR HAND COMPLETE 3 VIEWS BILATERAL: ICD-10-PCS | Mod: 26,50,, | Performed by: RADIOLOGY

## 2022-09-13 PROCEDURE — 1159F MED LIST DOCD IN RCRD: CPT | Mod: CPTII,S$GLB,, | Performed by: FAMILY MEDICINE

## 2022-09-13 PROCEDURE — 99999 PR PBB SHADOW E&M-EST. PATIENT-LVL V: CPT | Mod: PBBFAC,,, | Performed by: FAMILY MEDICINE

## 2022-09-13 PROCEDURE — 90471 IMMUNIZATION ADMIN: CPT | Mod: S$GLB,,, | Performed by: FAMILY MEDICINE

## 2022-09-13 PROCEDURE — 73130 X-RAY EXAM OF HAND: CPT | Mod: 26,50,, | Performed by: RADIOLOGY

## 2022-09-13 PROCEDURE — 3078F DIAST BP <80 MM HG: CPT | Mod: CPTII,S$GLB,, | Performed by: FAMILY MEDICINE

## 2022-09-13 RX ORDER — BUPROPION HYDROCHLORIDE 100 MG/1
100 TABLET, FILM COATED ORAL EVERY MORNING
COMMUNITY
Start: 2022-07-08

## 2022-09-13 NOTE — PROGRESS NOTES
Subjective:       Patient ID: Ernie Hunter is a 27 y.o. male.    Chief Complaint: Hand Pain and Hearing Problem    HPI Mr. Hunter presents today for hand pain and hearing problem.    In art  Graphic design  Pain with writing     Use to play football     Feels he needs to pop knuckles   Feels hand is heavy and getting stiff  Mostly on the left does occur on the right     Tries not to take any medication       Does not feel he hears a clearly as he should at times     Review of Systems   Constitutional:  Negative for activity change, appetite change, fatigue and fever.   HENT:  Negative for congestion, ear pain, facial swelling, hearing loss, sore throat and tinnitus.    Eyes:  Negative for redness and visual disturbance.   Respiratory:  Negative for cough, chest tightness and wheezing.    Gastrointestinal:  Negative for abdominal distention, abdominal pain, constipation, diarrhea, nausea and vomiting.   Endocrine: Negative for polydipsia and polyuria.   Genitourinary:  Negative for flank pain, frequency and penile discharge.   Musculoskeletal:  Negative for back pain, gait problem and joint swelling.   Skin:  Negative for rash.   Neurological:  Negative for dizziness, tremors, seizures, weakness and headaches.   Psychiatric/Behavioral:  Negative for agitation and confusion.          Past Medical History:   Diagnosis Date    Attention deficit hyperactivity disorder (ADHD), predominantly inattentive type     Mild intermittent asthma, uncomplicated     Prediabetes      No past surgical history on file.  Family History   Problem Relation Age of Onset    Hypertension Mother     Migraines Mother     Depression Mother     Anxiety disorder Mother     Hyperlipidemia Father     Hypertension Father     Heart attack Father         prior to 50    Heart failure Maternal Grandmother     Hypertension Maternal Grandmother     Hyperlipidemia Maternal Grandmother     Diabetes Maternal Grandmother     Diabetes  Paternal Grandfather     Hyperlipidemia Paternal Grandfather     Hypertension Paternal Grandfather     Arthritis Paternal Grandfather     Gout Paternal Grandfather      Social History     Socioeconomic History    Marital status: Single   Tobacco Use    Smoking status: Never    Smokeless tobacco: Never   Substance and Sexual Activity    Alcohol use: Yes       Objective:        Physical Exam  Vitals reviewed.   Constitutional:       Appearance: Normal appearance. He is obese.   HENT:      Head: Normocephalic and atraumatic.   Pulmonary:      Effort: Pulmonary effort is normal.   Musculoskeletal:      Cervical back: Normal range of motion.   Skin:     General: Skin is warm.   Neurological:      Mental Status: He is alert and oriented to person, place, and time.           Assessment/Plan:     Pain in both hands  -     X-Ray Hand 3 View Bilateral; Future; Expected date: 09/13/2022  -     RHEUMATOID FACTOR; Future; Expected date: 09/13/2022  -     CELESTINE by IFA, w/Rflx; Future; Expected date: 09/13/2022    Stiffness of left hand joint  -     X-Ray Hand 3 View Bilateral; Future; Expected date: 09/13/2022  -     RHEUMATOID FACTOR; Future; Expected date: 09/13/2022  -     CELESTINE by IFA, w/Rflx; Future; Expected date: 09/13/2022    Prediabetes  -     HEMOGLOBIN A1C; Future; Expected date: 09/13/2022    Arthritis  -     RHEUMATOID FACTOR; Future; Expected date: 09/13/2022  -     CELESTINE by IFA, w/Rflx; Future; Expected date: 09/13/2022    Hearing loss, unspecified hearing loss type, unspecified laterality  -     Ambulatory referral/consult to Audiology; Future; Expected date: 09/20/2022  -     Ambulatory referral/consult to ENT; Future; Expected date: 09/20/2022    Sleep apnea, unspecified type  -     Ambulatory referral/consult to Pulmonology; Future; Expected date: 09/20/2022    Need for HPV vaccination  -     (In Office Administered) HPV Vaccine (9-Valent) (3 Dose) (IM)      Follow up 3 months         Saba Palomares MD  [facility]    Family Medicine

## 2022-09-20 ENCOUNTER — OFFICE VISIT (OUTPATIENT)
Dept: PSYCHIATRY | Facility: CLINIC | Age: 27
End: 2022-09-20
Payer: COMMERCIAL

## 2022-09-20 VITALS
BODY MASS INDEX: 54.57 KG/M2 | DIASTOLIC BLOOD PRESSURE: 94 MMHG | HEART RATE: 85 BPM | WEIGHT: 315 LBS | SYSTOLIC BLOOD PRESSURE: 146 MMHG

## 2022-09-20 DIAGNOSIS — F32.A ANXIETY AND DEPRESSION: Primary | ICD-10-CM

## 2022-09-20 DIAGNOSIS — F41.9 ANXIETY AND DEPRESSION: Primary | ICD-10-CM

## 2022-09-20 DIAGNOSIS — F33.1 MDD (MAJOR DEPRESSIVE DISORDER), RECURRENT EPISODE, MODERATE: Primary | ICD-10-CM

## 2022-09-20 PROCEDURE — 3044F PR MOST RECENT HEMOGLOBIN A1C LEVEL <7.0%: ICD-10-PCS | Mod: CPTII,S$GLB,, | Performed by: PSYCHIATRY & NEUROLOGY

## 2022-09-20 PROCEDURE — 3080F DIAST BP >= 90 MM HG: CPT | Mod: CPTII,S$GLB,, | Performed by: PSYCHIATRY & NEUROLOGY

## 2022-09-20 PROCEDURE — 90792 PSYCH DIAG EVAL W/MED SRVCS: CPT | Mod: S$GLB,,, | Performed by: PSYCHIATRY & NEUROLOGY

## 2022-09-20 PROCEDURE — 3044F HG A1C LEVEL LT 7.0%: CPT | Mod: CPTII,S$GLB,, | Performed by: SOCIAL WORKER

## 2022-09-20 PROCEDURE — 99999 PR PBB SHADOW E&M-EST. PATIENT-LVL II: ICD-10-PCS | Mod: PBBFAC,,, | Performed by: PSYCHIATRY & NEUROLOGY

## 2022-09-20 PROCEDURE — 3077F PR MOST RECENT SYSTOLIC BLOOD PRESSURE >= 140 MM HG: ICD-10-PCS | Mod: CPTII,S$GLB,, | Performed by: PSYCHIATRY & NEUROLOGY

## 2022-09-20 PROCEDURE — 3044F HG A1C LEVEL LT 7.0%: CPT | Mod: CPTII,S$GLB,, | Performed by: PSYCHIATRY & NEUROLOGY

## 2022-09-20 PROCEDURE — 99999 PR PBB SHADOW E&M-EST. PATIENT-LVL II: CPT | Mod: PBBFAC,,, | Performed by: PSYCHIATRY & NEUROLOGY

## 2022-09-20 PROCEDURE — 90832 PSYTX W PT 30 MINUTES: CPT | Mod: S$GLB,,, | Performed by: SOCIAL WORKER

## 2022-09-20 PROCEDURE — 3077F SYST BP >= 140 MM HG: CPT | Mod: CPTII,S$GLB,, | Performed by: PSYCHIATRY & NEUROLOGY

## 2022-09-20 PROCEDURE — 3044F PR MOST RECENT HEMOGLOBIN A1C LEVEL <7.0%: ICD-10-PCS | Mod: CPTII,S$GLB,, | Performed by: SOCIAL WORKER

## 2022-09-20 PROCEDURE — 90792 PR PSYCHIATRIC DIAGNOSTIC EVALUATION W/MEDICAL SERVICES: ICD-10-PCS | Mod: S$GLB,,, | Performed by: PSYCHIATRY & NEUROLOGY

## 2022-09-20 PROCEDURE — 90832 PR PSYCHOTHERAPY W/PATIENT, 30 MIN: ICD-10-PCS | Mod: S$GLB,,, | Performed by: SOCIAL WORKER

## 2022-09-20 PROCEDURE — 3080F PR MOST RECENT DIASTOLIC BLOOD PRESSURE >= 90 MM HG: ICD-10-PCS | Mod: CPTII,S$GLB,, | Performed by: PSYCHIATRY & NEUROLOGY

## 2022-09-20 RX ORDER — ESCITALOPRAM OXALATE 10 MG/1
10 TABLET ORAL DAILY
Qty: 30 TABLET | Refills: 2 | Status: SHIPPED | OUTPATIENT
Start: 2022-09-20 | End: 2022-10-24 | Stop reason: SDUPTHER
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
## 2022-09-20 NOTE — PROGRESS NOTES
Individual Psychotherapy (PhD/LCSW)    9/20/2022    Site:  [place]         Therapeutic Intervention: Met with patient.  Outpatient - Insight oriented psychotherapy 30 min - CPT code 06920    Chief complaint/reason for encounter: attention deficit and anxiety     Interval history and content of current session:  Patient presents to ongoing individual therapy due to depression and anxiety.  He is late for the session due initial session with Dr. Lennon.  He will be starting Lexapro.  [facility].  He has a hard time focusing in school. He was recently late for a project that was due yesterday.  He still plans to turn in what he has so he can get some credit.  He continues to be stressed living with his aunt.  He has to fold laundry daily which he would not be doing if he lived alone.  He continues to work at the [employer] in [employer].  He notes that he does not have much time left between the two jobs and school.  Note that the current time crunch is only temporary to achieve his long term goal.  Explore priorities.  Encourage the patient to find one day where he might rest.  Encourage less distracting places to complete his work.  He likes working at the [employer] because he can watch as many movies as he wants without having to pay.  He admits he really does not have the time to devote to watching a movie.  His grandmother has seen that she sees some improvement in his mood and participation in family tasks.  He was frustrated that he initiated a family dinner on Sunday, but others were slow to participate.    Treatment plan:  Target symptoms: depression, anxiety   Why chosen therapy is appropriate versus another modality: relevant to diagnosis  Outcome monitoring methods: self-report, observation  Therapeutic intervention type: insight oriented psychotherapy, supportive psychotherapy, interactive psychotherapy     Risk  parameters:  Patient reports no suicidal ideation  Patient reports no homicidal ideation  Patient reports no self-injurious behavior  Patient reports no violent behavior     Verbal deficits: None     Patient's response to intervention:  The patient's response to intervention is accepting, motivated.     Progress toward goals and other mental status changes:  The patient's progress toward goals is fair .     Diagnosis:   Anxiety and depression     Plan:  individual psychotherapy and consult psychiatrist for medication evaluation, Dr. Lennon     Return to clinic: as scheduled     Length of Service (minutes): 30

## 2022-10-18 ENCOUNTER — OFFICE VISIT (OUTPATIENT)
Dept: PSYCHIATRY | Facility: CLINIC | Age: 27
End: 2022-10-18
Payer: COMMERCIAL

## 2022-10-18 DIAGNOSIS — F41.1 GENERALIZED ANXIETY DISORDER: Primary | ICD-10-CM

## 2022-10-18 PROCEDURE — 3044F HG A1C LEVEL LT 7.0%: CPT | Mod: CPTII,S$GLB,, | Performed by: SOCIAL WORKER

## 2022-10-18 PROCEDURE — 3044F PR MOST RECENT HEMOGLOBIN A1C LEVEL <7.0%: ICD-10-PCS | Mod: CPTII,S$GLB,, | Performed by: SOCIAL WORKER

## 2022-10-18 PROCEDURE — 90832 PSYTX W PT 30 MINUTES: CPT | Mod: S$GLB,,, | Performed by: SOCIAL WORKER

## 2022-10-18 PROCEDURE — 90832 PR PSYCHOTHERAPY W/PATIENT, 30 MIN: ICD-10-PCS | Mod: S$GLB,,, | Performed by: SOCIAL WORKER

## 2022-10-24 ENCOUNTER — OFFICE VISIT (OUTPATIENT)
Dept: INTERNAL MEDICINE | Facility: CLINIC | Age: 27
End: 2022-10-24
Payer: COMMERCIAL

## 2022-10-24 VITALS
HEART RATE: 119 BPM | DIASTOLIC BLOOD PRESSURE: 88 MMHG | WEIGHT: 315 LBS | SYSTOLIC BLOOD PRESSURE: 138 MMHG | OXYGEN SATURATION: 97 % | BODY MASS INDEX: 42.66 KG/M2 | RESPIRATION RATE: 18 BRPM | HEIGHT: 72 IN

## 2022-10-24 DIAGNOSIS — F41.9 ANXIETY AND DEPRESSION: Primary | ICD-10-CM

## 2022-10-24 DIAGNOSIS — F90.9 ATTENTION DEFICIT HYPERACTIVITY DISORDER (ADHD), UNSPECIFIED ADHD TYPE: ICD-10-CM

## 2022-10-24 DIAGNOSIS — F32.A ANXIETY AND DEPRESSION: Primary | ICD-10-CM

## 2022-10-24 PROCEDURE — 3079F PR MOST RECENT DIASTOLIC BLOOD PRESSURE 80-89 MM HG: ICD-10-PCS | Mod: CPTII,S$GLB,, | Performed by: FAMILY MEDICINE

## 2022-10-24 PROCEDURE — 3079F DIAST BP 80-89 MM HG: CPT | Mod: CPTII,S$GLB,, | Performed by: FAMILY MEDICINE

## 2022-10-24 PROCEDURE — 1159F MED LIST DOCD IN RCRD: CPT | Mod: CPTII,S$GLB,, | Performed by: FAMILY MEDICINE

## 2022-10-24 PROCEDURE — 3075F SYST BP GE 130 - 139MM HG: CPT | Mod: CPTII,S$GLB,, | Performed by: FAMILY MEDICINE

## 2022-10-24 PROCEDURE — 90471 FLU VACCINE (QUAD) GREATER THAN OR EQUAL TO 3YO PRESERVATIVE FREE IM: ICD-10-PCS | Mod: S$GLB,,, | Performed by: FAMILY MEDICINE

## 2022-10-24 PROCEDURE — 90686 IIV4 VACC NO PRSV 0.5 ML IM: CPT | Mod: S$GLB,,, | Performed by: FAMILY MEDICINE

## 2022-10-24 PROCEDURE — 99213 OFFICE O/P EST LOW 20 MIN: CPT | Mod: 25,S$GLB,, | Performed by: FAMILY MEDICINE

## 2022-10-24 PROCEDURE — 1159F PR MEDICATION LIST DOCUMENTED IN MEDICAL RECORD: ICD-10-PCS | Mod: CPTII,S$GLB,, | Performed by: FAMILY MEDICINE

## 2022-10-24 PROCEDURE — 3075F PR MOST RECENT SYSTOLIC BLOOD PRESS GE 130-139MM HG: ICD-10-PCS | Mod: CPTII,S$GLB,, | Performed by: FAMILY MEDICINE

## 2022-10-24 PROCEDURE — 99999 PR PBB SHADOW E&M-EST. PATIENT-LVL IV: CPT | Mod: PBBFAC,,, | Performed by: FAMILY MEDICINE

## 2022-10-24 PROCEDURE — 3044F HG A1C LEVEL LT 7.0%: CPT | Mod: CPTII,S$GLB,, | Performed by: FAMILY MEDICINE

## 2022-10-24 PROCEDURE — 90686 FLU VACCINE (QUAD) GREATER THAN OR EQUAL TO 3YO PRESERVATIVE FREE IM: ICD-10-PCS | Mod: S$GLB,,, | Performed by: FAMILY MEDICINE

## 2022-10-24 PROCEDURE — 90471 IMMUNIZATION ADMIN: CPT | Mod: S$GLB,,, | Performed by: FAMILY MEDICINE

## 2022-10-24 PROCEDURE — 3044F PR MOST RECENT HEMOGLOBIN A1C LEVEL <7.0%: ICD-10-PCS | Mod: CPTII,S$GLB,, | Performed by: FAMILY MEDICINE

## 2022-10-24 PROCEDURE — 99213 PR OFFICE/OUTPT VISIT, EST, LEVL III, 20-29 MIN: ICD-10-PCS | Mod: 25,S$GLB,, | Performed by: FAMILY MEDICINE

## 2022-10-24 PROCEDURE — 99999 PR PBB SHADOW E&M-EST. PATIENT-LVL IV: ICD-10-PCS | Mod: PBBFAC,,, | Performed by: FAMILY MEDICINE

## 2022-10-24 RX ORDER — ESCITALOPRAM OXALATE 20 MG/1
20 TABLET ORAL DAILY
Qty: 30 TABLET | Refills: 2 | Status: SHIPPED | OUTPATIENT
Start: 2022-10-24 | End: 2023-10-13

## 2022-10-24 RX ORDER — DEXTROAMPHETAMINE SACCHARATE, AMPHETAMINE ASPARTATE MONOHYDRATE, DEXTROAMPHETAMINE SULFATE AND AMPHETAMINE SULFATE 2.5; 2.5; 2.5; 2.5 MG/1; MG/1; MG/1; MG/1
10 CAPSULE, EXTENDED RELEASE ORAL EVERY MORNING
COMMUNITY
Start: 2022-09-21

## 2022-10-24 NOTE — PROGRESS NOTES
Individual Psychotherapy (PhD/LCSW)    10/18/2022    Site:  [place]         Therapeutic Intervention: Met with patient.  Outpatient - Insight oriented psychotherapy 45 min - CPT code 53028    Chief complaint/reason for encounter: attention deficit and anxiety     Interval history and content of current session:  Patient presents to ongoing individual therapy due to depression and anxiety.  He is late for the session.  He continues to work two jobs.  He is considering quitting one job in the spring because he will have to take three classes.  He will not be able to take classes in the summer because they are not offered.  He continues to live with his aunt and grandmother.  He has not had any recent conflict with them because he is often at school or work.  He is trying to focus on one class that has some difficulty to it.  He would like to get his own place eventually.  He is considering transferring from [employer] to [employer]".  He continues to complete chores at home.  Note that he is pursuing his dream which is the whole reason for his move to [place].  Emphasize that time management is a step toward success with academics.  Reflect that his time in school is only temporary.  He hopes to travel home to [place] for [place].  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  However, new movies are being released and his hours will be increased.  He has not had much time to exercise.    Treatment plan:  Target symptoms: depression, anxiety   Why chosen therapy is appropriate versus another modality: relevant to diagnosis  Outcome monitoring methods: self-report, observation  Therapeutic intervention type: insight oriented psychotherapy, supportive psychotherapy, interactive psychotherapy     Risk parameters:  Patient reports no suicidal ideation  Patient reports no homicidal ideation  Patient reports no self-injurious behavior  Patient reports no violent behavior     Verbal deficits: None      Patient's response to intervention:  The patient's response to intervention is accepting, motivated.     Progress toward goals and other mental status changes:  The patient's progress toward goals is fair .     Diagnosis:   Anxiety and depression     Plan:  individual psychotherapy and consult psychiatrist for medication evaluation, Dr. Lennon     Return to clinic: as scheduled     Length of Service (minutes): 30

## 2022-10-24 NOTE — PROGRESS NOTES
Subjective:       Patient ID: Ernie Hunter is a 27 y.o. male.    Chief Complaint: Follow-up    HPI  Mr. Hunter presents today for follow up.   He has been seeing psychiatry  He feels like he has not had or been in a bad moment when he has seen the therapist.     He is having a hard time in school and working 2 jobs.  He has been having a lot of anxiety and depression stemming from his responsibilities and not getting a lot of sleep.     He has been doing the same thing over and over everyday and is tired     Pt will cancel appt with ENT and audiology because his insurance does not cover it  He has bills from having to pay out of pocket for the other specialist he has seen so far  Plans to change insurance      Review of Systems   Constitutional:  Positive for activity change, appetite change and fatigue.   Respiratory: Negative.     Gastrointestinal: Negative.    Genitourinary: Negative.    Musculoskeletal: Negative.    Psychiatric/Behavioral:  Positive for agitation, decreased concentration, dysphoric mood and sleep disturbance.          Past Medical History:   Diagnosis Date    Attention deficit hyperactivity disorder (ADHD), predominantly inattentive type     Depression     Insomnia     Mild intermittent asthma, uncomplicated     Prediabetes      No past surgical history on file.  Family History   Problem Relation Age of Onset    Hypertension Mother     Migraines Mother     Depression Mother     Anxiety disorder Mother     ADD / ADHD Mother     Hyperlipidemia Father     Hypertension Father     Heart attack Father         prior to 50    Depression Sister     Anxiety disorder Sister     ADD / ADHD Brother     Heart failure Maternal Grandmother     Hypertension Maternal Grandmother     Hyperlipidemia Maternal Grandmother     Diabetes Maternal Grandmother     Diabetes Paternal Grandfather     Hyperlipidemia Paternal Grandfather     Hypertension Paternal Grandfather     Arthritis Paternal Grandfather      Gout Paternal Grandfather            Objective:        Physical Exam  Vitals reviewed.   Constitutional:       Appearance: He is obese.      Comments: Appears down   HENT:      Head: Normocephalic and atraumatic.   Neurological:      Mental Status: He is alert and oriented to person, place, and time.   Psychiatric:         Behavior: Behavior normal.         Results for orders placed or performed in visit on 09/13/22   RHEUMATOID FACTOR   Result Value Ref Range    Rheumatoid Factor <13.0 0.0 - 15.0 IU/mL   CELESTINE by IFA, w/Rflx   Result Value Ref Range    CELESTINE Negative <1:80 Negative <1:80   HEMOGLOBIN A1C   Result Value Ref Range    Hemoglobin A1C 5.8 (H) 4.0 - 5.6 %    Estimated Avg Glucose 120 68 - 131 mg/dL       Assessment/Plan:     Anxiety and depression    Attention deficit hyperactivity disorder (ADHD), unspecified ADHD type    Other orders  -     EScitalopram oxalate (LEXAPRO) 20 MG tablet; Take 1 tablet (20 mg total) by mouth once daily.  Dispense: 30 tablet; Refill: 2  -     Influenza - Quadrivalent (PF)    Discussed possibly stopping one of his jobs to help with his anxiety and mental health    Will refill adderall when needed     Follow up as needed    Saba Palomares MD  [facility]   Family Medicine

## 2022-11-11 ENCOUNTER — OFFICE VISIT (OUTPATIENT)
Dept: PSYCHIATRY | Facility: CLINIC | Age: 27
End: 2022-11-11
Payer: COMMERCIAL

## 2022-11-11 DIAGNOSIS — F90.9 ATTENTION DEFICIT HYPERACTIVITY DISORDER (ADHD), UNSPECIFIED ADHD TYPE: ICD-10-CM

## 2022-11-11 DIAGNOSIS — F41.1 GENERALIZED ANXIETY DISORDER: Primary | ICD-10-CM

## 2022-11-11 DIAGNOSIS — F33.1 MDD (MAJOR DEPRESSIVE DISORDER), RECURRENT EPISODE, MODERATE: ICD-10-CM

## 2022-11-11 PROCEDURE — 3044F PR MOST RECENT HEMOGLOBIN A1C LEVEL <7.0%: ICD-10-PCS | Mod: CPTII,S$GLB,, | Performed by: PSYCHIATRY & NEUROLOGY

## 2022-11-11 PROCEDURE — 99214 OFFICE O/P EST MOD 30 MIN: CPT | Mod: S$GLB,,, | Performed by: PSYCHIATRY & NEUROLOGY

## 2022-11-11 PROCEDURE — 3044F HG A1C LEVEL LT 7.0%: CPT | Mod: CPTII,S$GLB,, | Performed by: PSYCHIATRY & NEUROLOGY

## 2022-11-11 PROCEDURE — 99214 PR OFFICE/OUTPT VISIT, EST, LEVL IV, 30-39 MIN: ICD-10-PCS | Mod: S$GLB,,, | Performed by: PSYCHIATRY & NEUROLOGY

## 2022-11-11 RX ORDER — BUPROPION HYDROCHLORIDE 150 MG/1
TABLET ORAL
Qty: 60 TABLET | Refills: 2 | Status: SHIPPED | OUTPATIENT
Start: 2022-11-11 | End: 2023-06-26 | Stop reason: SDUPTHER

## 2022-11-11 RX ORDER — METHYLPHENIDATE HYDROCHLORIDE 20 MG/1
TABLET ORAL
Qty: 30 TABLET | Refills: 0 | Status: SHIPPED | OUTPATIENT
Start: 2022-11-11 | End: 2023-06-14 | Stop reason: SDUPTHER

## 2022-11-11 RX ORDER — METHYLPHENIDATE HYDROCHLORIDE 20 MG/1
TABLET ORAL
Qty: 30 TABLET | Refills: 0 | Status: SHIPPED | OUTPATIENT
Start: 2022-12-11 | End: 2023-06-26 | Stop reason: SDUPTHER
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
## 2022-12-08 ENCOUNTER — OFFICE VISIT (OUTPATIENT)
Dept: PSYCHIATRY | Facility: CLINIC | Age: 27
End: 2022-12-08
Payer: COMMERCIAL

## 2022-12-08 DIAGNOSIS — F41.1 GENERALIZED ANXIETY DISORDER: Primary | ICD-10-CM

## 2022-12-08 PROCEDURE — 90834 PSYTX W PT 45 MINUTES: CPT | Mod: S$GLB,,, | Performed by: SOCIAL WORKER

## 2022-12-08 PROCEDURE — 3044F PR MOST RECENT HEMOGLOBIN A1C LEVEL <7.0%: ICD-10-PCS | Mod: CPTII,S$GLB,, | Performed by: SOCIAL WORKER

## 2022-12-08 PROCEDURE — 90834 PR PSYCHOTHERAPY W/PATIENT, 45 MIN: ICD-10-PCS | Mod: S$GLB,,, | Performed by: SOCIAL WORKER

## 2022-12-08 PROCEDURE — 3044F HG A1C LEVEL LT 7.0%: CPT | Mod: CPTII,S$GLB,, | Performed by: SOCIAL WORKER

## 2022-12-14 NOTE — PROGRESS NOTES
"Individual Psychotherapy (PhD/LCSW)    2022    Site:  [facility]         Therapeutic Intervention: Met with patient.  Outpatient - Insight oriented psychotherapy 45 min - CPT code 93852    Chief complaint/reason for encounter: attention deficit and anxiety     Interval history and content of current session:  Patient presents to ongoing individual therapy due to depression and anxiety.  He was last in session on 10/18/22.  He feels that he is coming out of a dark depression.  He denies any current suicidal ideation.  He has quit his job with [employer] in [facility].  He has continued his job at the [employer].  His aunt was upset that he quit a job, but she admitted that he is an adult.  He is wanting to focus more on his classes at [employer].  He may have to retake one of the classes.  He knows what he needs to do to pass the class.  His paternal grandfather .  He was not able to go due to school and work.  He was not very close to his father.  The patient's father was upset he did not attend.  The patient feels that he is pursuing the reason why he moved to [place] by going to school to choose a profession.  Emphasize that he has power when he makes a choice.  Note that he can begin to create the life that he would want for himself in the future.  Encourage the patient to foster positive relationships.  Praise socialization.  He will spend time with his co workers from the [employer].  They will go to someone's house to play video games and talk.  His aunt gets upset when he does not tell her what he is doing.  He has a goal of getting his own place to live.  He is considering transferring to [employer] to live in student housing.  He continues to participate in chores at his aunts house.  He is looking forward to travelling to [place] to see family and friends.  He will be spending time with a friend's family that took him in lot during his high school years.  They are a "second family" to " him.    Treatment plan:  Target symptoms: depression, anxiety   Why chosen therapy is appropriate versus another modality: relevant to diagnosis  Outcome monitoring methods: self-report, observation  Therapeutic intervention type: insight oriented psychotherapy, supportive psychotherapy, interactive psychotherapy     Risk parameters:  Patient reports no suicidal ideation  Patient reports no homicidal ideation  Patient reports no self-injurious behavior  Patient reports no violent behavior     Verbal deficits: None     Patient's response to intervention:  The patient's response to intervention is accepting, motivated.     Progress toward goals and other mental status changes:  The patient's progress toward goals is fair .     Diagnosis: Generalized Anxiety Disorder     Plan:  individual psychotherapy and consult psychiatrist for medication evaluation, Dr. Lennon     Return to clinic: as scheduled     Length of Service (minutes):  45

## 2022-12-16 ENCOUNTER — OFFICE VISIT (OUTPATIENT)
Dept: PSYCHIATRY | Facility: CLINIC | Age: 27
End: 2022-12-16
Payer: COMMERCIAL

## 2022-12-16 DIAGNOSIS — F41.1 GENERALIZED ANXIETY DISORDER: Primary | ICD-10-CM

## 2022-12-16 PROCEDURE — 3044F PR MOST RECENT HEMOGLOBIN A1C LEVEL <7.0%: ICD-10-PCS | Mod: CPTII,S$GLB,, | Performed by: SOCIAL WORKER

## 2022-12-16 PROCEDURE — 90834 PSYTX W PT 45 MINUTES: CPT | Mod: S$GLB,,, | Performed by: SOCIAL WORKER

## 2022-12-16 PROCEDURE — 90834 PR PSYCHOTHERAPY W/PATIENT, 45 MIN: ICD-10-PCS | Mod: S$GLB,,, | Performed by: SOCIAL WORKER

## 2022-12-16 PROCEDURE — 3044F HG A1C LEVEL LT 7.0%: CPT | Mod: CPTII,S$GLB,, | Performed by: SOCIAL WORKER

## 2022-12-16 NOTE — PROGRESS NOTES
"Individual Psychotherapy (PhD/LCSW)    12/16/2022    Site:  [facility]         Therapeutic Intervention: Met with patient.  Outpatient - Insight oriented psychotherapy 45 min - CPT code 91832    Chief complaint/reason for encounter: attention deficit and anxiety     Interval history and content of current session:  Patient presents to ongoing individual therapy due to depression and anxiety.  He will be leaving for [place] Wednesday.  He has mixed feelings about returning home.  His twelve year old brother has texted him about having difficulty with their mother.  His brother is involved in chorus, football, and band.  The patient admits his brother reminds him of himself.  The patient remembers being involved in as many activities as he could so he could escape home.  His brother feels like he is being bullied by their mother.  The patient has considered holding a family meeting, but his sister advised against it.  He is also worried about his seventeen year sister.  She has had problems with depression before.  She is a eri in high school.  He has heard that she is now drinking to deal with her emotions.  Note that the patient can give feedback and support to his family, but his family could refuse his efforts.  Emphasize that he will only be home for a short period of time.  Praise the patient for reaching academic goals.  He was able to pass both classes last semester.  He will be taking the classes in the spring.  He is frustrated with the poor communication with his aunt.  He will text her details about his schedule including work, travel, and school.  She will not respond to his texts.  He is looking forward to a [employer] party with his work friends tonight.  He will be visiting his friend when he returned home.  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  Their personalities "clicked."  The friend is "dying" from an auto immune disease.  He might live for twenty " more years.  He has six children.  The patient enjoys playing with the kids.    Treatment plan:  Target symptoms: depression, anxiety   Why chosen therapy is appropriate versus another modality: relevant to diagnosis  Outcome monitoring methods: self-report, observation  Therapeutic intervention type: insight oriented psychotherapy, supportive psychotherapy, interactive psychotherapy     Risk parameters:  Patient reports no suicidal ideation  Patient reports no homicidal ideation  Patient reports no self-injurious behavior  Patient reports no violent behavior     Verbal deficits: None     Patient's response to intervention:  The patient's response to intervention is accepting, motivated.     Progress toward goals and other mental status changes:  The patient's progress toward goals is fair .     Diagnosis: Generalized Anxiety Disorder     Plan:  individual psychotherapy and consult psychiatrist for medication evaluation, Dr. Lennon     Return to clinic: as scheduled     Length of Service (minutes):  45

## 2022-12-30 ENCOUNTER — OFFICE VISIT (OUTPATIENT)
Dept: PSYCHIATRY | Facility: CLINIC | Age: 27
End: 2022-12-30
Payer: COMMERCIAL

## 2022-12-30 DIAGNOSIS — F41.1 GENERALIZED ANXIETY DISORDER: Primary | ICD-10-CM

## 2022-12-30 PROCEDURE — 90834 PSYTX W PT 45 MINUTES: CPT | Mod: S$GLB,,, | Performed by: SOCIAL WORKER

## 2022-12-30 PROCEDURE — 90834 PR PSYCHOTHERAPY W/PATIENT, 45 MIN: ICD-10-PCS | Mod: S$GLB,,, | Performed by: SOCIAL WORKER

## 2023-01-03 NOTE — PROGRESS NOTES
"Individual Psychotherapy (PhD/LCSW)    2022    Site:  [facility]         Therapeutic Intervention: Met with patient.  Outpatient - Insight oriented psychotherapy 45 min - CPT code 98731    Chief complaint/reason for encounter: attention deficit and anxiety     Interval history and content of current session:   Patient presents to ongoing individual therapy due to depression and anxiety.  He travelled home to [place] for [employer].  He spent the afternoon running errands with his family when he arrived.  He was happy to see his sister, who is close in age to him.  She is in college about two hours from their home town.  He feels that she is "the best of us."  He is proud of her accomplishments.  He could see the behavioral issues with his brother.  He tried to have a talk with him, but he is not sure what his bother heard.  He went to his father's house.  They were supposed to go to his father's family.  His paternal grandfather  a couple of months ago.  The majority of the patient's life his father has talked bad about the patient's grandfather.  His father's family has a way of no longer talking to family members when they have conflict.  The patient "went off" on his father.  He noted that they struggle on his side not because of their mother but because of the things their father has said.  Emphasize that the patient has made some changes in his life which makes him different in the return trip home.  Praise steps to be honest with his father.  Encourage continued work toward his goals in the coming semester.  After the confrontation, his father asked the patient if he was a good or a bad father.  The patient noted that he had been a good father.  The patient admits he would need to have a similar conversation with his mother.  He is worried it won't go well.  He also tried to talk to his younger sister, but she isolated in her bedroom the majority of the time.  He admits that he is not " responsible for his siblings.  The patient is proud of his accomplishments since moving to [place].      Treatment plan:  Target symptoms: depression, anxiety   Why chosen therapy is appropriate versus another modality: relevant to diagnosis  Outcome monitoring methods: self-report, observation  Therapeutic intervention type: insight oriented psychotherapy, supportive psychotherapy, interactive psychotherapy     Risk parameters:  Patient reports no suicidal ideation  Patient reports no homicidal ideation  Patient reports no self-injurious behavior  Patient reports no violent behavior     Verbal deficits: None     Patient's response to intervention:  The patient's response to intervention is accepting, motivated.     Progress toward goals and other mental status changes:  The patient's progress toward goals is fair .     Diagnosis: Generalized Anxiety Disorder     Plan:  individual psychotherapy and consult psychiatrist for medication evaluation, Dr. Lennon     Return to clinic: as scheduled     Length of Service (minutes):  45

## 2023-03-28 ENCOUNTER — PATIENT MESSAGE (OUTPATIENT)
Dept: RESEARCH | Facility: HOSPITAL | Age: 28
End: 2023-03-28

## 2023-05-04 ENCOUNTER — OFFICE VISIT (OUTPATIENT)
Dept: PSYCHIATRY | Facility: CLINIC | Age: 28
End: 2023-05-04
Payer: MEDICAID

## 2023-05-04 DIAGNOSIS — F41.1 GENERALIZED ANXIETY DISORDER: Primary | ICD-10-CM

## 2023-05-04 PROCEDURE — 90834 PSYTX W PT 45 MINUTES: CPT | Mod: ,,, | Performed by: SOCIAL WORKER

## 2023-05-04 PROCEDURE — 90834 PR PSYCHOTHERAPY W/PATIENT, 45 MIN: ICD-10-PCS | Mod: ,,, | Performed by: SOCIAL WORKER

## 2023-05-04 NOTE — PROGRESS NOTES
Individual Psychotherapy (PhD/LCSW)    5/4/2023    Site:  [facility]         Therapeutic Intervention: Met with patient.  Outpatient - Insight oriented psychotherapy 45 min - CPT code 01517    Chief complaint/reason for encounter: attention deficit and anxiety     Interval history and content of current session:  Patient presents to ongoing individual therapy due to depression and anxiety.  He was last in session on 12/30/22.  He has not come to session recently due to medical debt and the expense of appointments.  He recently got Medicaid insurance.  He is completing a certificate in [employer] at [employer].  He will then transfer to [employer].  He is planning on moving out of his aunt's home in May of 2024.  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  He did apply for an  position.  He did not get the job, but he will be eligible if another  positions becomes available.  He has been struggling to get tasks complete.  He has final exams in two weeks.  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  Educate the patient about the importance of sleep.  Explore the idea of lists and alarms.  Note that being financially strapped at this time is due to the sacrifice he is making.  Praise steps to accomplish his goal.  He is excited about the art techniques he is learning.  He is sometimes up till 2am due to his job.  He will hyper focus on a task which will make him late for appointments.  He does not believe he can ask his parents for help to pay medical debt.  He is not sure if he will attend summer school.  He has not had any recent conflict with his mother and grandmother.  He does have a F in one of his classes.  It is an online class.  He will forget that he has the class.  He needs to complete the work to bring up his grade.     Treatment plan:  Target symptoms: depression, anxiety   Why chosen therapy is appropriate versus another modality: relevant to diagnosis  Outcome monitoring  methods: self-report, observation  Therapeutic intervention type: insight oriented psychotherapy, supportive psychotherapy, interactive psychotherapy     Risk parameters:  Patient reports no suicidal ideation  Patient reports no homicidal ideation  Patient reports no self-injurious behavior  Patient reports no violent behavior     Verbal deficits: None     Patient's response to intervention:  The patient's response to intervention is accepting, motivated.     Progress toward goals and other mental status changes:  The patient's progress toward goals is fair .     Diagnosis: Generalized Anxiety Disorder     Plan:  individual psychotherapy and consult psychiatrist for medication evaluation, Dr. Lennon     Return to clinic: as scheduled     Length of Service (minutes):  45

## 2023-05-09 NOTE — PROGRESS NOTES
Subjective:       Patient ID: Ernie Hunter is a 27 y.o. male.    Chief Complaint: Immunizations    HPI Mr Hunter presents today for immunization, he is in need for an updated record to be given to him  He was without insurance for a while but now he is covered.   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   HENT:  Negative for congestion, ear pain, facial swelling, hearing loss, sore throat and tinnitus.    Eyes:  Negative for redness and visual disturbance.   Respiratory:  Negative for cough, chest tightness and wheezing.    Gastrointestinal:  Negative for abdominal distention, abdominal pain, constipation, diarrhea, nausea and vomiting.   Endocrine: Negative for polydipsia and polyuria.   Genitourinary:  Negative for flank pain, frequency and penile discharge.   Musculoskeletal:  Negative for back pain, gait problem and joint swelling.   Skin:  Negative for rash.   Neurological:  Negative for dizziness, tremors, seizures, weakness and headaches.   Psychiatric/Behavioral:  Negative for agitation and confusion.          Past Medical History:   Diagnosis Date    Attention deficit hyperactivity disorder (ADHD), predominantly inattentive type     Depression     Insomnia     Mild intermittent asthma, uncomplicated     Prediabetes      No past surgical history on file.  Family History   Problem Relation Age of Onset    Hypertension Mother     Migraines Mother     Depression Mother     Anxiety disorder Mother     ADD / ADHD Mother     Hyperlipidemia Father     Hypertension Father     Heart attack Father         prior to 50    Depression Sister     Anxiety disorder Sister     ADD / ADHD Brother     Heart failure Maternal Grandmother     Hypertension Maternal Grandmother     Hyperlipidemia Maternal Grandmother      Diabetes Maternal Grandmother     Diabetes Paternal Grandfather     Hyperlipidemia Paternal Grandfather     Hypertension Paternal Grandfather     Arthritis Paternal Grandfather     Gout Paternal Grandfather      Social History     Socioeconomic History    Marital status: Single   Tobacco Use    Smoking status: Never    Smokeless tobacco: Never   Substance and Sexual Activity    Alcohol use: Yes       Objective:        Physical Exam  Vitals reviewed.   Constitutional:       Appearance: Normal appearance. He is obese.   HENT:      Head: Normocephalic and atraumatic.   Skin:     General: Skin is warm.         Results for orders placed or performed in visit on 09/13/22   RHEUMATOID FACTOR   Result Value Ref Range    Rheumatoid Factor <13.0 0.0 - 15.0 IU/mL   CELESTINE by IFA, w/Rflx   Result Value Ref Range    CELESTINE Negative <1:80 Negative <1:80   HEMOGLOBIN A1C   Result Value Ref Range    Hemoglobin A1C 5.8 (H) 4.0 - 5.6 %    Estimated Avg Glucose 120 68 - 131 mg/dL       Assessment/Plan:     Pain in both hands  -     EMG W/ ULTRASOUND AND NERVE CONDUCTION TEST 2 Extremities; Future    Prediabetes  -     metFORMIN (GLUCOPHAGE-XR) 500 MG ER 24hr tablet; Take 1 tablet (500 mg total) by mouth daily with breakfast.  Dispense: 90 tablet; Refill: 3    Need for Tdap vaccination  -     (In Office Administered) Tdap Vaccine    Need for HPV vaccine  -     (In Office Administered) HPV Vaccine (9-Valent) (3 Dose) (IM)          Follow up as needed     Saba Palomares MD  [facility]   Family Medicine

## 2023-05-10 ENCOUNTER — OFFICE VISIT (OUTPATIENT)
Dept: INTERNAL MEDICINE | Facility: CLINIC | Age: 28
End: 2023-05-10
Payer: MEDICAID

## 2023-05-10 VITALS
BODY MASS INDEX: 42.66 KG/M2 | DIASTOLIC BLOOD PRESSURE: 98 MMHG | OXYGEN SATURATION: 97 % | SYSTOLIC BLOOD PRESSURE: 136 MMHG | HEART RATE: 80 BPM | WEIGHT: 315 LBS | HEIGHT: 72 IN | RESPIRATION RATE: 18 BRPM | TEMPERATURE: 97 F

## 2023-05-10 DIAGNOSIS — Z23 NEED FOR TDAP VACCINATION: ICD-10-CM

## 2023-05-10 DIAGNOSIS — M79.642 PAIN IN BOTH HANDS: Primary | ICD-10-CM

## 2023-05-10 DIAGNOSIS — Z23 NEED FOR HPV VACCINE: ICD-10-CM

## 2023-05-10 DIAGNOSIS — R73.03 PREDIABETES: ICD-10-CM

## 2023-05-10 DIAGNOSIS — M79.641 PAIN IN BOTH HANDS: Primary | ICD-10-CM

## 2023-05-10 PROCEDURE — 90651 9VHPV VACCINE 2/3 DOSE IM: CPT | Mod: PBBFAC

## 2023-05-10 PROCEDURE — 3008F PR BODY MASS INDEX (BMI) DOCUMENTED: ICD-10-PCS | Mod: CPTII,,, | Performed by: FAMILY MEDICINE

## 2023-05-10 PROCEDURE — 3075F SYST BP GE 130 - 139MM HG: CPT | Mod: CPTII,,, | Performed by: FAMILY MEDICINE

## 2023-05-10 PROCEDURE — 1159F MED LIST DOCD IN RCRD: CPT | Mod: CPTII,,, | Performed by: FAMILY MEDICINE

## 2023-05-10 PROCEDURE — 3080F DIAST BP >= 90 MM HG: CPT | Mod: CPTII,,, | Performed by: FAMILY MEDICINE

## 2023-05-10 PROCEDURE — 90471 IMMUNIZATION ADMIN: CPT | Mod: PBBFAC

## 2023-05-10 PROCEDURE — 99213 OFFICE O/P EST LOW 20 MIN: CPT | Mod: PBBFAC | Performed by: FAMILY MEDICINE

## 2023-05-10 PROCEDURE — 3080F PR MOST RECENT DIASTOLIC BLOOD PRESSURE >= 90 MM HG: ICD-10-PCS | Mod: CPTII,,, | Performed by: FAMILY MEDICINE

## 2023-05-10 PROCEDURE — 99214 OFFICE O/P EST MOD 30 MIN: CPT | Mod: S$PBB,,, | Performed by: FAMILY MEDICINE

## 2023-05-10 PROCEDURE — 3008F BODY MASS INDEX DOCD: CPT | Mod: CPTII,,, | Performed by: FAMILY MEDICINE

## 2023-05-10 PROCEDURE — 99214 PR OFFICE/OUTPT VISIT, EST, LEVL IV, 30-39 MIN: ICD-10-PCS | Mod: S$PBB,,, | Performed by: FAMILY MEDICINE

## 2023-05-10 PROCEDURE — 99999 PR PBB SHADOW E&M-EST. PATIENT-LVL III: ICD-10-PCS | Mod: PBBFAC,,, | Performed by: FAMILY MEDICINE

## 2023-05-10 PROCEDURE — 3075F PR MOST RECENT SYSTOLIC BLOOD PRESS GE 130-139MM HG: ICD-10-PCS | Mod: CPTII,,, | Performed by: FAMILY MEDICINE

## 2023-05-10 PROCEDURE — 99999 PR PBB SHADOW E&M-EST. PATIENT-LVL III: CPT | Mod: PBBFAC,,, | Performed by: FAMILY MEDICINE

## 2023-05-10 PROCEDURE — 1159F PR MEDICATION LIST DOCUMENTED IN MEDICAL RECORD: ICD-10-PCS | Mod: CPTII,,, | Performed by: FAMILY MEDICINE

## 2023-05-10 PROCEDURE — 90472 IMMUNIZATION ADMIN EACH ADD: CPT | Mod: PBBFAC

## 2023-05-10 RX ORDER — METFORMIN HYDROCHLORIDE 500 MG/1
500 TABLET, EXTENDED RELEASE ORAL
Qty: 90 TABLET | Refills: 3 | Status: SHIPPED | OUTPATIENT
Start: 2023-05-10 | End: 2024-05-09

## 2023-05-18 ENCOUNTER — OFFICE VISIT (OUTPATIENT)
Dept: PSYCHIATRY | Facility: CLINIC | Age: 28
End: 2023-05-18
Payer: MEDICAID

## 2023-05-18 DIAGNOSIS — F41.1 GENERALIZED ANXIETY DISORDER: Primary | ICD-10-CM

## 2023-05-18 PROCEDURE — 90834 PSYTX W PT 45 MINUTES: CPT | Mod: ,,, | Performed by: SOCIAL WORKER

## 2023-05-18 PROCEDURE — 90834 PR PSYCHOTHERAPY W/PATIENT, 45 MIN: ICD-10-PCS | Mod: ,,, | Performed by: SOCIAL WORKER
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
## 2023-05-26 ENCOUNTER — PATIENT MESSAGE (OUTPATIENT)
Dept: INTERNAL MEDICINE | Facility: CLINIC | Age: 28
End: 2023-05-26
Payer: MEDICAID

## 2023-06-14 ENCOUNTER — PATIENT MESSAGE (OUTPATIENT)
Dept: INTERNAL MEDICINE | Facility: CLINIC | Age: 28
End: 2023-06-14
Payer: MEDICAID

## 2023-06-14 RX ORDER — METHYLPHENIDATE HYDROCHLORIDE 20 MG/1
TABLET ORAL
Qty: 30 TABLET | Refills: 0 | Status: SHIPPED | OUTPATIENT
Start: 2023-06-14 | End: 2023-06-26 | Stop reason: SDUPTHER

## 2023-06-14 NOTE — TELEPHONE ENCOUNTER
Care Due:                  Date            Visit Type   Department     Provider  --------------------------------------------------------------------------------                                EP -                              PRIMARY      [facility] INTERNAL  Last Visit: 05-      CARE ([facility])   MEDICINE       Saba Palomares  Next Visit: None Scheduled  None         None Found                                                            Last  Test          Frequency    Reason                     Performed    Due Date  --------------------------------------------------------------------------------    Cr..........  12 months..  metFORMIN................  05- 04-    HBA1C.......  6 months...  metFORMIN................  09- 03-    [facility] Embedded Care Due Messages. Reference number: [id number].   6/14/2023 10:27:17 AM CDT

## 2023-06-26 ENCOUNTER — OFFICE VISIT (OUTPATIENT)
Dept: PSYCHIATRY | Facility: CLINIC | Age: 28
End: 2023-06-26
Payer: MEDICAID

## 2023-06-26 VITALS
SYSTOLIC BLOOD PRESSURE: 124 MMHG | BODY MASS INDEX: 53.45 KG/M2 | WEIGHT: 315 LBS | DIASTOLIC BLOOD PRESSURE: 86 MMHG | HEART RATE: 74 BPM

## 2023-06-26 DIAGNOSIS — F33.1 MDD (MAJOR DEPRESSIVE DISORDER), RECURRENT EPISODE, MODERATE: Primary | ICD-10-CM

## 2023-06-26 DIAGNOSIS — F41.1 GENERALIZED ANXIETY DISORDER: ICD-10-CM

## 2023-06-26 DIAGNOSIS — F90.9 ATTENTION DEFICIT HYPERACTIVITY DISORDER (ADHD), UNSPECIFIED ADHD TYPE: ICD-10-CM

## 2023-06-26 PROCEDURE — 99214 PR OFFICE/OUTPT VISIT, EST, LEVL IV, 30-39 MIN: ICD-10-PCS | Mod: S$PBB,,, | Performed by: PSYCHIATRY & NEUROLOGY

## 2023-06-26 PROCEDURE — 99212 OFFICE O/P EST SF 10 MIN: CPT | Mod: PBBFAC | Performed by: PSYCHIATRY & NEUROLOGY

## 2023-06-26 PROCEDURE — 3074F SYST BP LT 130 MM HG: CPT | Mod: CPTII,,, | Performed by: PSYCHIATRY & NEUROLOGY

## 2023-06-26 PROCEDURE — 99999 PR PBB SHADOW E&M-EST. PATIENT-LVL II: CPT | Mod: PBBFAC,AF,HB, | Performed by: PSYCHIATRY & NEUROLOGY

## 2023-06-26 PROCEDURE — 3008F PR BODY MASS INDEX (BMI) DOCUMENTED: ICD-10-PCS | Mod: CPTII,,, | Performed by: PSYCHIATRY & NEUROLOGY

## 2023-06-26 PROCEDURE — 3008F BODY MASS INDEX DOCD: CPT | Mod: CPTII,,, | Performed by: PSYCHIATRY & NEUROLOGY

## 2023-06-26 PROCEDURE — 99214 OFFICE O/P EST MOD 30 MIN: CPT | Mod: S$PBB,,, | Performed by: PSYCHIATRY & NEUROLOGY

## 2023-06-26 PROCEDURE — 3074F PR MOST RECENT SYSTOLIC BLOOD PRESSURE < 130 MM HG: ICD-10-PCS | Mod: CPTII,,, | Performed by: PSYCHIATRY & NEUROLOGY

## 2023-06-26 PROCEDURE — 99999 PR PBB SHADOW E&M-EST. PATIENT-LVL II: ICD-10-PCS | Mod: PBBFAC,AF,HB, | Performed by: PSYCHIATRY & NEUROLOGY

## 2023-06-26 PROCEDURE — 3079F PR MOST RECENT DIASTOLIC BLOOD PRESSURE 80-89 MM HG: ICD-10-PCS | Mod: CPTII,,, | Performed by: PSYCHIATRY & NEUROLOGY

## 2023-06-26 PROCEDURE — 3079F DIAST BP 80-89 MM HG: CPT | Mod: CPTII,,, | Performed by: PSYCHIATRY & NEUROLOGY

## 2023-06-26 RX ORDER — METHYLPHENIDATE HYDROCHLORIDE 20 MG/1
TABLET ORAL
Qty: 30 TABLET | Refills: 0 | Status: SHIPPED | OUTPATIENT
Start: 2023-06-26 | End: 2023-09-06 | Stop reason: SDUPTHER

## 2023-06-26 RX ORDER — METHYLPHENIDATE HYDROCHLORIDE 20 MG/1
TABLET ORAL
Qty: 30 TABLET | Refills: 0 | Status: SHIPPED | OUTPATIENT
Start: 2023-07-26 | End: 2023-10-13 | Stop reason: SDUPTHER

## 2023-06-26 RX ORDER — METHYLPHENIDATE HYDROCHLORIDE 20 MG/1
TABLET ORAL
Qty: 30 TABLET | Refills: 0 | Status: SHIPPED | OUTPATIENT
Start: 2023-08-25 | End: 2023-10-13 | Stop reason: SDUPTHER

## 2023-06-26 RX ORDER — BUPROPION HYDROCHLORIDE 150 MG/1
TABLET ORAL
Qty: 60 TABLET | Refills: 2 | Status: SHIPPED | OUTPATIENT
Start: 2023-06-26 | End: 2023-10-13
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
## 2023-06-29 ENCOUNTER — OFFICE VISIT (OUTPATIENT)
Dept: PSYCHIATRY | Facility: CLINIC | Age: 28
End: 2023-06-29
Payer: MEDICAID

## 2023-06-29 DIAGNOSIS — F41.1 GENERALIZED ANXIETY DISORDER: Primary | ICD-10-CM

## 2023-06-29 PROCEDURE — 90834 PR PSYCHOTHERAPY W/PATIENT, 45 MIN: ICD-10-PCS | Mod: ,,, | Performed by: SOCIAL WORKER

## 2023-06-29 PROCEDURE — 90834 PSYTX W PT 45 MINUTES: CPT | Mod: ,,, | Performed by: SOCIAL WORKER

## 2023-06-29 NOTE — PROGRESS NOTES
"Individual Psychotherapy (PhD/LCSW)    6/29/2023    [facility]         Therapeutic Intervention: Met with patient.  Outpatient - Insight oriented psychotherapy 45 min - CPT code 56570    Chief complaint/reason for encounter: attention deficit and anxiety     Interval history and content of current session:  Patient presents to ongoing individual therapy due to depression and anxiety.  He was last in session on 5/18/23.  He took a cruise out of [place] last week.  He was motion sick the first days for the cruise.  He felt better on his birthday.  He struggles with his birth date due to being alone.  He recalls making friends with "everyone" when he was in middle school.  He wanted to be the friend for others that he did not have.  He later recognized that there was no reciprocity.  He now has a few friends that he is close to.  He is applying for a  position  He continues to work at the [employer].  He worries that his aunt and grandmother are talking about him behind his back.  Emphasize that a person can be alone when they are in a relationship.  Encourage the patient to find work in his field.  Reflect that living with his grandmother and his aunt is only temporary.  Praise steps to focus on himself.  He admits when he takes the medication for ADHD he feels "normal."  He is trying to adjust to the medication.  He is struggling with being awake into the early morning due to his job.  His aunt and grandmother are up early.  He would like to find his own place, but he recognizes the financial pressure that would come with that decision.  He met with Dr. Lennon.  They decided to leave his medication the same.  He enjoyed the [place] and a party on the boat during his vacation.    Treatment plan:  Target symptoms: depression, anxiety   Why chosen therapy is appropriate versus another modality: relevant to diagnosis  Outcome monitoring methods: self-report, observation  Therapeutic " intervention type: insight oriented psychotherapy, supportive psychotherapy, interactive psychotherapy     Risk parameters:  Patient reports no suicidal ideation  Patient reports no homicidal ideation  Patient reports no self-injurious behavior  Patient reports no violent behavior     Verbal deficits: None     Patient's response to intervention:  The patient's response to intervention is accepting, motivated.     Progress toward goals and other mental status changes:  The patient's progress toward goals is fair .     Diagnosis: Generalized Anxiety Disorder     Plan:  individual psychotherapy and consult psychiatrist for medication evaluation, Dr. Lennon     Return to clinic: as scheduled     Length of Service (minutes):  45

## 2023-07-07 ENCOUNTER — PATIENT MESSAGE (OUTPATIENT)
Dept: INFECTIOUS DISEASES | Facility: CLINIC | Age: 28
End: 2023-07-07
Payer: MEDICAID

## 2023-08-02 ENCOUNTER — TELEPHONE (OUTPATIENT)
Dept: PSYCHIATRY | Facility: CLINIC | Age: 28
End: 2023-08-02
Payer: MEDICAID

## 2023-08-04 ENCOUNTER — OFFICE VISIT (OUTPATIENT)
Dept: PSYCHIATRY | Facility: CLINIC | Age: 28
End: 2023-08-04
Payer: MEDICAID

## 2023-08-04 DIAGNOSIS — F41.1 GENERALIZED ANXIETY DISORDER: Primary | ICD-10-CM

## 2023-08-04 PROCEDURE — 90834 PR PSYCHOTHERAPY W/PATIENT, 45 MIN: ICD-10-PCS | Mod: ,,, | Performed by: SOCIAL WORKER

## 2023-08-04 PROCEDURE — 90834 PSYTX W PT 45 MINUTES: CPT | Mod: ,,, | Performed by: SOCIAL WORKER
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
## 2023-09-06 RX ORDER — METHYLPHENIDATE HYDROCHLORIDE 20 MG/1
TABLET ORAL
Qty: 60 TABLET | Refills: 0 | Status: SHIPPED | OUTPATIENT
Start: 2023-09-06 | End: 2023-10-13 | Stop reason: SDUPTHER

## 2023-09-28 ENCOUNTER — OFFICE VISIT (OUTPATIENT)
Dept: PSYCHIATRY | Facility: CLINIC | Age: 28
End: 2023-09-28
Payer: MEDICAID

## 2023-09-28 DIAGNOSIS — F41.1 GENERALIZED ANXIETY DISORDER: Primary | ICD-10-CM

## 2023-09-28 PROCEDURE — 90832 PSYTX W PT 30 MINUTES: CPT | Mod: ,,, | Performed by: SOCIAL WORKER

## 2023-09-28 PROCEDURE — 90832 PR PSYCHOTHERAPY W/PATIENT, 30 MIN: ICD-10-PCS | Mod: ,,, | Performed by: SOCIAL WORKER

## 2023-09-28 NOTE — PROGRESS NOTES
Individual Psychotherapy (PhD/LCSW)    9/28/2023    Site:  [place]         Therapeutic Intervention: Met with patient.  Outpatient - Insight oriented psychotherapy 30 min - CPT code 44327    Chief complaint/reason for encounter: attention deficit and anxiety     Interval history and content of current session:  Patient presents late to ongoing individual therapy due to depression and anxiety.  He was last in session on 8/4/23.  He has a new work study position.  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  He was frustrated with his supervisor.  He started before he could get paid.  She is still working to compensate him for the time when he started.  He has gotten connected with the marketing department down the hoffman from his work study.  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  He is considering transferring to that department.  He has been struggling to complete work for an a synchronous class.  He is often rushing to complete work for a deadline.  His mother has talked about the patient moving back to [place].  She has a building that is located five minutes from her where he could do his t-shirts.  Encourage th patient to set boundaries and communicate directly with his mother.  Reflect that he has been able to accomplish some of his goals with the move to [place].  Explore how the patient can use time management with his class.  When he lived in [place], he was living for his parents.  He travelled to [place] last weekend to spend time with his mother and meet his cousin.  He enjoyed talking to artists in [place].  He plans to have a conversation with his mother about his plans to continue living in [place].  He has thought of living in [place] or [place] in the future.    Treatment plan:  Target symptoms: depression, anxiety   Why chosen therapy is appropriate versus another modality: relevant to diagnosis  Outcome monitoring methods: self-report,  observation  Therapeutic intervention type: insight oriented psychotherapy, supportive psychotherapy, interactive psychotherapy     Risk parameters:  Patient reports no suicidal ideation  Patient reports no homicidal ideation  Patient reports no self-injurious behavior  Patient reports no violent behavior     Verbal deficits: None     Patient's response to intervention:  The patient's response to intervention is accepting, motivated.     Progress toward goals and other mental status changes:  The patient's progress toward goals is fair .     Diagnosis: Generalized Anxiety Disorder     Plan:  individual psychotherapy and consult psychiatrist for medication evaluation, Dr. Lennon     Return to clinic: as scheduled     Length of Service (minutes):   30

## 2023-10-13 ENCOUNTER — OFFICE VISIT (OUTPATIENT)
Dept: PSYCHIATRY | Facility: CLINIC | Age: 28
End: 2023-10-13
Payer: MEDICAID

## 2023-10-13 VITALS — WEIGHT: 315 LBS | BODY MASS INDEX: 55.82 KG/M2

## 2023-10-13 DIAGNOSIS — F90.9 ATTENTION DEFICIT HYPERACTIVITY DISORDER (ADHD), UNSPECIFIED ADHD TYPE: ICD-10-CM

## 2023-10-13 DIAGNOSIS — F41.1 GENERALIZED ANXIETY DISORDER: Primary | ICD-10-CM

## 2023-10-13 DIAGNOSIS — F33.0 MDD (MAJOR DEPRESSIVE DISORDER), RECURRENT EPISODE, MILD: ICD-10-CM

## 2023-10-13 PROCEDURE — 99999 PR PBB SHADOW E&M-EST. PATIENT-LVL I: CPT | Mod: PBBFAC,AF,HB, | Performed by: PSYCHIATRY & NEUROLOGY

## 2023-10-13 PROCEDURE — 99211 OFF/OP EST MAY X REQ PHY/QHP: CPT | Mod: PBBFAC | Performed by: PSYCHIATRY & NEUROLOGY

## 2023-10-13 PROCEDURE — 99214 OFFICE O/P EST MOD 30 MIN: CPT | Mod: S$PBB,,, | Performed by: PSYCHIATRY & NEUROLOGY

## 2023-10-13 PROCEDURE — 3008F BODY MASS INDEX DOCD: CPT | Mod: CPTII,,, | Performed by: PSYCHIATRY & NEUROLOGY

## 2023-10-13 PROCEDURE — 3008F PR BODY MASS INDEX (BMI) DOCUMENTED: ICD-10-PCS | Mod: CPTII,,, | Performed by: PSYCHIATRY & NEUROLOGY

## 2023-10-13 PROCEDURE — 99999 PR PBB SHADOW E&M-EST. PATIENT-LVL I: ICD-10-PCS | Mod: PBBFAC,AF,HB, | Performed by: PSYCHIATRY & NEUROLOGY

## 2023-10-13 PROCEDURE — 99214 PR OFFICE/OUTPT VISIT, EST, LEVL IV, 30-39 MIN: ICD-10-PCS | Mod: S$PBB,,, | Performed by: PSYCHIATRY & NEUROLOGY

## 2023-10-13 RX ORDER — METHYLPHENIDATE HYDROCHLORIDE 20 MG/1
TABLET ORAL
Qty: 30 TABLET | Refills: 0 | Status: SHIPPED | OUTPATIENT
Start: 2023-12-12 | End: 2024-01-24 | Stop reason: SDUPTHER

## 2023-10-13 RX ORDER — METHYLPHENIDATE HYDROCHLORIDE 20 MG/1
TABLET ORAL
Qty: 60 TABLET | Refills: 0 | Status: SHIPPED | OUTPATIENT
Start: 2023-10-13 | End: 2024-01-24 | Stop reason: SDUPTHER

## 2023-10-13 RX ORDER — METHYLPHENIDATE HYDROCHLORIDE 20 MG/1
TABLET ORAL
Qty: 30 TABLET | Refills: 0 | Status: SHIPPED | OUTPATIENT
Start: 2023-11-12 | End: 2024-01-24 | Stop reason: SDUPTHER

## 2023-10-13 RX ORDER — SERTRALINE HYDROCHLORIDE 100 MG/1
TABLET, FILM COATED ORAL
Qty: 30 TABLET | Refills: 2 | Status: SHIPPED | OUTPATIENT
Start: 2023-10-13 | End: 2024-01-24 | Stop reason: SDUPTHER
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
## 2023-11-20 ENCOUNTER — OFFICE VISIT (OUTPATIENT)
Dept: PSYCHIATRY | Facility: CLINIC | Age: 28
End: 2023-11-20
Payer: MEDICAID

## 2023-11-20 DIAGNOSIS — F41.1 GENERALIZED ANXIETY DISORDER: Primary | ICD-10-CM

## 2023-11-20 PROCEDURE — 90834 PSYTX W PT 45 MINUTES: CPT | Mod: ,,, | Performed by: SOCIAL WORKER

## 2023-11-20 PROCEDURE — 90834 PR PSYCHOTHERAPY W/PATIENT, 45 MIN: ICD-10-PCS | Mod: ,,, | Performed by: SOCIAL WORKER

## 2023-11-22 NOTE — PROGRESS NOTES
Individual Psychotherapy (PhD/LCSW)    11/20/2023    Site:  [place]         Therapeutic Intervention: Met with patient.  Outpatient - Insight oriented psychotherapy 45 min - CPT code 84685    Chief complaint/reason for encounter: attention deficit and anxiety     Interval history and content of current session: Patient presents late to ongoing individual therapy due to depression and anxiety.  He was last in session on 9/28/23.  His mood has been fluctuating.  He will have periods where he is depressed.  He has been overwhelmed by the amount of class work he has.  He plans to graduate in May.  He will then transition to [employer].  He does not have enough money to travel home for the holidays.  His car is old, and he is not sure that he can make it.  He was able to treat himself to a concert in [facility] in October, but he went alone.  He plans to travel home in May when his sister also graduates from college.  He continues to work at the [employer].  He will sometimes have to work late hours which disrupts his sleep pattern.  Note that he is making sacrifices to reach his future goals.  Emphasize the gains he has made in recent years.  Praise steps to plan an enjoyable time at a concert.  Encourage working toward more balance in his life.  He is eager to get his own place in the summer of next year.  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  The patient does not plan to move back.  He would like to live in other places.  He is looking forward to the month off in between semesters.  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  He is able to do some school work while he is at that job.    Treatment plan:  Target symptoms: depression, anxiety   Why chosen therapy is appropriate versus another modality: relevant to diagnosis  Outcome monitoring methods: self-report, observation  Therapeutic intervention type: insight oriented psychotherapy, supportive psychotherapy, interactive  psychotherapy     Risk parameters:  Patient reports no suicidal ideation  Patient reports no homicidal ideation  Patient reports no self-injurious behavior  Patient reports no violent behavior     Verbal deficits: None     Patient's response to intervention:  The patient's response to intervention is accepting, motivated.     Progress toward goals and other mental status changes:  The patient's progress toward goals is fair .     Diagnosis: Generalized Anxiety Disorder     Plan:  individual psychotherapy and consult psychiatrist for medication evaluation, Dr. Lennon     Return to clinic: as scheduled     Length of Service (minutes): 45

## 2024-01-05 ENCOUNTER — TELEPHONE (OUTPATIENT)
Dept: PSYCHIATRY | Facility: CLINIC | Age: 29
End: 2024-01-05
Payer: MEDICAID

## 2024-01-05 ENCOUNTER — PATIENT MESSAGE (OUTPATIENT)
Dept: PSYCHIATRY | Facility: CLINIC | Age: 29
End: 2024-01-05
Payer: MEDICAID

## 2024-01-17 ENCOUNTER — TELEPHONE (OUTPATIENT)
Dept: PSYCHIATRY | Facility: CLINIC | Age: 29
End: 2024-01-17
Payer: MEDICAID

## 2024-01-19 ENCOUNTER — OFFICE VISIT (OUTPATIENT)
Dept: PSYCHIATRY | Facility: CLINIC | Age: 29
End: 2024-01-19
Payer: MEDICAID

## 2024-01-19 DIAGNOSIS — F41.1 GENERALIZED ANXIETY DISORDER: Primary | ICD-10-CM

## 2024-01-19 PROCEDURE — 90834 PSYTX W PT 45 MINUTES: CPT | Mod: ,,, | Performed by: SOCIAL WORKER

## 2024-01-19 NOTE — PROGRESS NOTES
Individual Psychotherapy (PhD/LCSW)    2024    Site:  [place] [facility]         Therapeutic Intervention: Met with patient.  Outpatient - Insight oriented psychotherapy 45 min - CPT code 91603    Chief complaint/reason for encounter: attention deficit and anxiety     Interval history and content of current session:  Patient presents late to ongoing individual therapy due to depression and anxiety.  He was last in session on 23.  He was able to pull his cumulative average up to a 3.0.  He will be graduating in the spring with his associates.  He plans to go to [employer] for his bachelors in fine arts.  He will live in the student housing.  He forgot to take his medication for two weeks.  He felt a recurrence of depression.  He wonders if some of his behaviors are due to the trauma he experienced in his childhood.  He was a single child for a period before his mother met his step father.  He recalls his step father asking his mother not to protect the patient so much.  He experienced a lot of death at a young age.  His 14 year old brother got a new phone for [employer].  He has been texting that he is worried about their mother due to problems with their 17 yo sister.  She has been drinking heavily and using drugs.  Their mother does not feel safe around her daughter.  The patient has felt like a father to his younger siblings.  Confront the patient that he is a brother not a father to his siblings.  Educate the patient about the grief process and memorializing his loved ones.  Note that the patient may have created rigid boundaries with his mother by moving out of state.  Emphasize the need to regulate his sleep cycle.  He admits he has been staying up till the sun is up.  He will then sleep till noon.  He continues to work at the [employer].  He has regrets about how he treated his step father.  His step father  of liver cancer in his sixties.  He  a year after his diagnosis.  The patient recalls worrying  more about his mother at his step father's  instead of dealing with his own grief.  He does plan to visit his family graves after he graduates to share his success.    Treatment plan:  Target symptoms: depression, anxiety   Why chosen therapy is appropriate versus another modality: relevant to diagnosis  Outcome monitoring methods: self-report, observation  Therapeutic intervention type: insight oriented psychotherapy, supportive psychotherapy, interactive psychotherapy     Risk parameters:  Patient reports no suicidal ideation  Patient reports no homicidal ideation  Patient reports no self-injurious behavior  Patient reports no violent behavior     Verbal deficits: None     Patient's response to intervention:  The patient's response to intervention is accepting, motivated.     Progress toward goals and other mental status changes:  The patient's progress toward goals is fair .     Diagnosis: Generalized Anxiety Disorder     Plan:  individual psychotherapy and consult psychiatrist for medication evaluation, Dr. Lennon     Return to clinic: as scheduled     Length of Service (minutes): 45

## 2024-01-22 ENCOUNTER — TELEPHONE (OUTPATIENT)
Dept: PSYCHIATRY | Facility: CLINIC | Age: 29
End: 2024-01-22
Payer: MEDICAID

## 2024-01-24 ENCOUNTER — TELEPHONE (OUTPATIENT)
Dept: PSYCHIATRY | Facility: CLINIC | Age: 29
End: 2024-01-24
Payer: MEDICAID

## 2024-01-24 ENCOUNTER — OFFICE VISIT (OUTPATIENT)
Dept: PSYCHIATRY | Facility: CLINIC | Age: 29
End: 2024-01-24
Payer: MEDICAID

## 2024-01-24 DIAGNOSIS — F90.9 ATTENTION DEFICIT HYPERACTIVITY DISORDER (ADHD), UNSPECIFIED ADHD TYPE: Primary | ICD-10-CM

## 2024-01-24 DIAGNOSIS — F41.1 GENERALIZED ANXIETY DISORDER: ICD-10-CM

## 2024-01-24 PROCEDURE — 99214 OFFICE O/P EST MOD 30 MIN: CPT | Mod: 95,,, | Performed by: PSYCHIATRY & NEUROLOGY

## 2024-01-24 RX ORDER — METHYLPHENIDATE HYDROCHLORIDE 20 MG/1
TABLET ORAL
Qty: 30 TABLET | Refills: 0 | Status: SHIPPED | OUTPATIENT
Start: 2024-02-23 | End: 2024-03-12 | Stop reason: SDUPTHER

## 2024-01-24 RX ORDER — METHYLPHENIDATE HYDROCHLORIDE 20 MG/1
TABLET ORAL
Qty: 60 TABLET | Refills: 0 | Status: SHIPPED | OUTPATIENT
Start: 2024-01-24 | End: 2024-06-18 | Stop reason: SDUPTHER

## 2024-01-24 RX ORDER — METHYLPHENIDATE HYDROCHLORIDE 20 MG/1
TABLET ORAL
Qty: 30 TABLET | Refills: 0 | Status: SHIPPED | OUTPATIENT
Start: 2024-03-24 | End: 2024-06-18 | Stop reason: SDUPTHER

## 2024-01-24 RX ORDER — SERTRALINE HYDROCHLORIDE 100 MG/1
TABLET, FILM COATED ORAL
Qty: 30 TABLET | Refills: 3 | Status: SHIPPED | OUTPATIENT
Start: 2024-01-24 | End: 2024-06-18 | Stop reason: SDUPTHER
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
## 2024-01-24 NOTE — TELEPHONE ENCOUNTER
----- Message from Gela Peralta sent at 1/24/2024  4:25 PM CST -----  Patient states he just had a flat tire and is requesting to make the appt virtual. Call back at [phone]

## 2024-02-13 ENCOUNTER — PATIENT MESSAGE (OUTPATIENT)
Dept: PSYCHIATRY | Facility: CLINIC | Age: 29
End: 2024-02-13
Payer: MEDICAID

## 2024-03-08 ENCOUNTER — PATIENT MESSAGE (OUTPATIENT)
Dept: PSYCHIATRY | Facility: CLINIC | Age: 29
End: 2024-03-08
Payer: MEDICAID

## 2024-03-08 ENCOUNTER — TELEPHONE (OUTPATIENT)
Dept: PSYCHIATRY | Facility: CLINIC | Age: 29
End: 2024-03-08
Payer: MEDICAID

## 2024-03-12 ENCOUNTER — OFFICE VISIT (OUTPATIENT)
Dept: PSYCHIATRY | Facility: CLINIC | Age: 29
End: 2024-03-12
Payer: MEDICAID

## 2024-03-12 DIAGNOSIS — F41.1 GENERALIZED ANXIETY DISORDER: Primary | ICD-10-CM

## 2024-03-12 DIAGNOSIS — F90.9 ATTENTION DEFICIT HYPERACTIVITY DISORDER (ADHD), UNSPECIFIED ADHD TYPE: ICD-10-CM

## 2024-03-12 PROCEDURE — 90834 PSYTX W PT 45 MINUTES: CPT | Mod: ,,, | Performed by: SOCIAL WORKER

## 2024-03-12 RX ORDER — METHYLPHENIDATE HYDROCHLORIDE 20 MG/1
TABLET ORAL
Qty: 30 TABLET | Refills: 0 | Status: SHIPPED | OUTPATIENT
Start: 2024-03-12 | End: 2024-06-18 | Stop reason: SDUPTHER

## 2024-03-25 NOTE — PROGRESS NOTES
Individual Psychotherapy (PhD/LCSW)    3/12/2024    Site:  [facility]         Therapeutic Intervention: Met with patient.  Outpatient - Insight oriented psychotherapy 45 min - CPT code 06234    Chief complaint/reason for encounter: attention deficit and anxiety     Interval history and content of current session: Patient presents late to ongoing individual therapy due to depression and anxiety.  He was last in session on 1/19/24.  He continues to work toward graduation with an associates degree from [employer].  He still plans to attend [employer] for his bachelors.  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  He has not told his mother that he does not plan to return to [place].  He has not confronted her about some of the harsh parenting choices she made in the past.  He is not sure when he will do so.  He does believe the direct conversation he had with his father helped him.  His mother will be coming to [place] again when her sibling comes to visit the patient's cousin.  The patient plans to spend some time with her.  Note that the patient has worked to achieve his goals.  Educate the patient about internal and external boundaries.  Emphasize that not returning to [place] is an external boundary.  Explore how the patient can take better care of himself.  He struggles with trying to take responsibility for his younger siblings.  He does have a close relationship with a sister, who is also attending college.  He is looking forward to attending a local parade with friends.  It will be the first parade he has attended.  He has been getting along with his grandmother and aunt.  He has been taking care of his chores at home.  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    Treatment plan:  Target symptoms: depression, anxiety   Why chosen therapy is appropriate versus another modality: relevant to diagnosis  Outcome monitoring methods: self-report,  observation  Therapeutic intervention type: insight oriented psychotherapy, supportive psychotherapy, interactive psychotherapy     Risk parameters:  Patient reports no suicidal ideation  Patient reports no homicidal ideation  Patient reports no self-injurious behavior  Patient reports no violent behavior     Verbal deficits: None     Patient's response to intervention:  The patient's response to intervention is accepting, motivated.     Progress toward goals and other mental status changes:  The patient's progress toward goals is fair .     Diagnosis: Generalized Anxiety Disorder     Plan:  individual psychotherapy and consult psychiatrist for medication evaluation, Dr. Lennon     Return to clinic: as scheduled     Length of Service (minutes): 45

## 2024-04-15 ENCOUNTER — TELEPHONE (OUTPATIENT)
Dept: PSYCHIATRY | Facility: CLINIC | Age: 29
End: 2024-04-15
Payer: MEDICAID

## 2024-04-17 ENCOUNTER — OFFICE VISIT (OUTPATIENT)
Dept: PSYCHIATRY | Facility: CLINIC | Age: 29
End: 2024-04-17
Payer: MEDICAID

## 2024-04-17 DIAGNOSIS — F41.1 GENERALIZED ANXIETY DISORDER: Primary | ICD-10-CM

## 2024-04-17 PROCEDURE — 90834 PSYTX W PT 45 MINUTES: CPT | Mod: ,,, | Performed by: SOCIAL WORKER

## 2024-04-25 NOTE — PROGRESS NOTES
Individual Psychotherapy (PhD/LCSW)    4/17/2024    Site:  [facility]         Therapeutic Intervention: Met with patient.  Outpatient - Insight oriented psychotherapy 45 min - CPT code 03480    Chief complaint/reason for encounter: attention deficit and anxiety     Interval history and content of current session: Patient presents late to ongoing individual therapy due to depression and anxiety.  He was last in session on 3/12/24.  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  He had a fellow employee complain about an inappropriate conversation.  He notes that the employees regularly talk in that way.  However, this employee wanted to take someone down with her.  The patient has been increasing the hours at his work study.  He will have an issue of cash flow.  However, he will be travelling home for two of his siblings graduations.  His family will be traveling to [place] for his graduation from [employer].  He is happy he does not have to arrange his work schedule around travel and his graduation.  Emphasize that the patient has been achieving some of his goals he came to [place] to accomplish.  Encourage the patient to work toward getting a job in his field for experience.  Validate the frustration with being fired over a small matter.  Educate the patient about the positive benefits of sleep, exercise, and nutrition for good mental health.  He continues to look forward to transitioning to [employer].  He is working on his admission to the university.  He may try to find a job close to his new dorm.  He is looking forward to having his own space and moving out of his aunt's house.         Treatment plan:  Target symptoms: depression, anxiety   Why chosen therapy is appropriate versus another modality: relevant to diagnosis  Outcome monitoring methods: self-report, observation  Therapeutic intervention type: insight oriented psychotherapy, supportive psychotherapy, interactive psychotherapy     Risk parameters:  Patient  reports no suicidal ideation  Patient reports no homicidal ideation  Patient reports no self-injurious behavior  Patient reports no violent behavior     Verbal deficits: None     Patient's response to intervention:  The patient's response to intervention is accepting, motivated.     Progress toward goals and other mental status changes:  The patient's progress toward goals is fair .     Diagnosis: Generalized Anxiety Disorder     Plan:  individual psychotherapy and consult psychiatrist for medication evaluation, Dr. Lennon     Return to clinic: as scheduled     Length of Service (minutes): 45

## 2024-05-13 ENCOUNTER — TELEPHONE (OUTPATIENT)
Dept: PSYCHIATRY | Facility: CLINIC | Age: 29
End: 2024-05-13
Payer: MEDICAID

## 2024-05-14 ENCOUNTER — TELEPHONE (OUTPATIENT)
Dept: PSYCHIATRY | Facility: CLINIC | Age: 29
End: 2024-05-14
Payer: MEDICAID

## 2024-05-14 NOTE — TELEPHONE ENCOUNTER
Call to cancel. A f/u appt with Omar Burroughs. On Wednesday 5/15/24 at1:00 pm. Pt will f/u on 6/25/24 at 4:00 pm. Due to a b/o.

## 2024-05-22 ENCOUNTER — TELEPHONE (OUTPATIENT)
Dept: PSYCHIATRY | Facility: CLINIC | Age: 29
End: 2024-05-22
Payer: MEDICAID

## 2024-05-22 NOTE — TELEPHONE ENCOUNTER
CELINA Wren) called patient to book out July 30th appt. Patient was not happy because he has already had previous book outs. Patient wanted to cancel all foreseeable appts with Omar. Asked patient if he would like Omar to reach out to him. Patient stated no. CELINA Wren) informed patient that she will escalate this to her supervisor so that the patients feels they are being heard. CELINA Wren) contacted the supervisor and informed the provider.

## 2024-05-29 NOTE — PROGRESS NOTES
Individual Psychotherapy (PhD/LCSW)    9/8/2022    Site:  [facility]         Therapeutic Intervention: Met with patient.  Outpatient - Insight oriented psychotherapy 30 min - CPT code 35025    Chief complaint/reason for encounter: depression and anxiety     Interval history and content of current session:  Patient presents to ongoing individual therapy due to depression and anxiety.  He is late for the session due to having to talk to financial services.  He has a healthcare spending account which he plans to use for the services.  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  He is going to [employer].  He is taking two classes in [employer] design.  He thought he would have to wait a year to declare residence, but [employer] does not have that requirement.  He is working toward a certificate instead of an associate.  He felt that he needed to enroll in school to get closer to his long term goal.  Unfortunately, his paternal aunt, who he lives with, does not agree.  She wanted him to continue to hold two jobs to pay down some of the debt he has built up.  Explore what goals the patient has for the future.  Note that he moved here to have stability and reach his career goals  Encourage the patient to find his voice to follow.  His father is in agreement with his aunt.  His mother supports the patient.  She has sent him some school supplies.  He does not understand why his aunt is acting this way.  He notes that she sacrificed to get herself through nursing school.    Treatment plan:  Target symptoms: depression, anxiety   Why chosen therapy is appropriate versus another modality: relevant to diagnosis  Outcome monitoring methods: self-report, observation  Therapeutic intervention type: insight oriented psychotherapy, supportive psychotherapy, interactive psychotherapy     Risk parameters:  Patient reports no suicidal  ideation  Patient reports no homicidal ideation  Patient reports no self-injurious behavior  Patient reports no violent behavior     Verbal deficits: None     Patient's response to intervention:  The patient's response to intervention is accepting, motivated.     Progress toward goals and other mental status changes:  The patient's progress toward goals is fair .     Diagnosis:   Anxiety and depression     Plan:  individual psychotherapy and consult psychiatrist for medication evaluation, Dr. Lennon     Return to clinic: as scheduled     Length of Service (minutes): 30         None

## 2024-06-11 ENCOUNTER — OFFICE VISIT (OUTPATIENT)
Dept: PRIMARY CARE CLINIC | Facility: CLINIC | Age: 29
End: 2024-06-11
Payer: MEDICAID

## 2024-06-11 ENCOUNTER — LAB VISIT (OUTPATIENT)
Dept: LAB | Facility: HOSPITAL | Age: 29
End: 2024-06-11
Attending: NURSE PRACTITIONER
Payer: MEDICAID

## 2024-06-11 VITALS
BODY MASS INDEX: 44.1 KG/M2 | TEMPERATURE: 99 F | WEIGHT: 315 LBS | HEART RATE: 76 BPM | DIASTOLIC BLOOD PRESSURE: 96 MMHG | HEIGHT: 71 IN | OXYGEN SATURATION: 99 % | SYSTOLIC BLOOD PRESSURE: 122 MMHG

## 2024-06-11 DIAGNOSIS — Z13.1 SCREENING FOR DIABETES MELLITUS: ICD-10-CM

## 2024-06-11 DIAGNOSIS — Z13.220 ENCOUNTER FOR LIPID SCREENING FOR CARDIOVASCULAR DISEASE: ICD-10-CM

## 2024-06-11 DIAGNOSIS — Z11.59 ENCOUNTER FOR HEPATITIS C SCREENING TEST FOR LOW RISK PATIENT: ICD-10-CM

## 2024-06-11 DIAGNOSIS — F41.8 DEPRESSION WITH ANXIETY: ICD-10-CM

## 2024-06-11 DIAGNOSIS — Z11.4 SCREENING FOR HIV (HUMAN IMMUNODEFICIENCY VIRUS): ICD-10-CM

## 2024-06-11 DIAGNOSIS — Z13.6 ENCOUNTER FOR LIPID SCREENING FOR CARDIOVASCULAR DISEASE: ICD-10-CM

## 2024-06-11 DIAGNOSIS — Z00.00 GENERAL MEDICAL EXAM: Primary | ICD-10-CM

## 2024-06-11 DIAGNOSIS — Z00.00 GENERAL MEDICAL EXAM: ICD-10-CM

## 2024-06-11 LAB
ALBUMIN SERPL BCP-MCNC: 3.9 G/DL (ref 3.5–5.2)
ALP SERPL-CCNC: 60 U/L (ref 55–135)
ALT SERPL W/O P-5'-P-CCNC: 37 U/L (ref 10–44)
ANION GAP SERPL CALC-SCNC: 14 MMOL/L (ref 8–16)
AST SERPL-CCNC: 22 U/L (ref 10–40)
BASOPHILS # BLD AUTO: 0.01 K/UL (ref 0–0.2)
BASOPHILS NFR BLD: 0.2 % (ref 0–1.9)
BILIRUB SERPL-MCNC: 0.8 MG/DL (ref 0.1–1)
BUN SERPL-MCNC: 9 MG/DL (ref 6–20)
CALCIUM SERPL-MCNC: 9.5 MG/DL (ref 8.7–10.5)
CHLORIDE SERPL-SCNC: 105 MMOL/L (ref 95–110)
CHOLEST SERPL-MCNC: 173 MG/DL (ref 120–199)
CHOLEST/HDLC SERPL: 4.3 {RATIO} (ref 2–5)
CO2 SERPL-SCNC: 22 MMOL/L (ref 23–29)
CREAT SERPL-MCNC: 1 MG/DL (ref 0.5–1.4)
DIFFERENTIAL METHOD BLD: NORMAL
EOSINOPHIL # BLD AUTO: 0.1 K/UL (ref 0–0.5)
EOSINOPHIL NFR BLD: 2.1 % (ref 0–8)
ERYTHROCYTE [DISTWIDTH] IN BLOOD BY AUTOMATED COUNT: 13 % (ref 11.5–14.5)
EST. GFR  (NO RACE VARIABLE): >60 ML/MIN/1.73 M^2
ESTIMATED AVG GLUCOSE: 120 MG/DL (ref 68–131)
GLUCOSE SERPL-MCNC: 93 MG/DL (ref 70–110)
HBA1C MFR BLD: 5.8 % (ref 4–5.6)
HCT VFR BLD AUTO: 50.7 % (ref 40–54)
HCV AB SERPL QL IA: NORMAL
HDLC SERPL-MCNC: 40 MG/DL (ref 40–75)
HDLC SERPL: 23.1 % (ref 20–50)
HGB BLD-MCNC: 16.5 G/DL (ref 14–18)
HIV 1+2 AB+HIV1 P24 AG SERPL QL IA: NORMAL
IMM GRANULOCYTES # BLD AUTO: 0.01 K/UL (ref 0–0.04)
IMM GRANULOCYTES NFR BLD AUTO: 0.2 % (ref 0–0.5)
LDLC SERPL CALC-MCNC: 120.8 MG/DL (ref 63–159)
LYMPHOCYTES # BLD AUTO: 1.7 K/UL (ref 1–4.8)
LYMPHOCYTES NFR BLD: 32.2 % (ref 18–48)
MCH RBC QN AUTO: 29.2 PG (ref 27–31)
MCHC RBC AUTO-ENTMCNC: 32.5 G/DL (ref 32–36)
MCV RBC AUTO: 90 FL (ref 82–98)
MONOCYTES # BLD AUTO: 0.6 K/UL (ref 0.3–1)
MONOCYTES NFR BLD: 11.1 % (ref 4–15)
NEUTROPHILS # BLD AUTO: 2.8 K/UL (ref 1.8–7.7)
NEUTROPHILS NFR BLD: 54.2 % (ref 38–73)
NONHDLC SERPL-MCNC: 133 MG/DL
NRBC BLD-RTO: 0 /100 WBC
PLATELET # BLD AUTO: 305 K/UL (ref 150–450)
PMV BLD AUTO: 11.6 FL (ref 9.2–12.9)
POTASSIUM SERPL-SCNC: 3.8 MMOL/L (ref 3.5–5.1)
PROT SERPL-MCNC: 7.5 G/DL (ref 6–8.4)
RBC # BLD AUTO: 5.65 M/UL (ref 4.6–6.2)
SODIUM SERPL-SCNC: 141 MMOL/L (ref 136–145)
TRIGL SERPL-MCNC: 61 MG/DL (ref 30–150)
WBC # BLD AUTO: 5.21 K/UL (ref 3.9–12.7)

## 2024-06-11 PROCEDURE — 85025 COMPLETE CBC W/AUTO DIFF WBC: CPT | Performed by: NURSE PRACTITIONER

## 2024-06-11 PROCEDURE — 84481 FREE ASSAY (FT-3): CPT | Performed by: NURSE PRACTITIONER

## 2024-06-11 PROCEDURE — 83036 HEMOGLOBIN GLYCOSYLATED A1C: CPT | Performed by: NURSE PRACTITIONER

## 2024-06-11 PROCEDURE — 87389 HIV-1 AG W/HIV-1&-2 AB AG IA: CPT | Performed by: NURSE PRACTITIONER

## 2024-06-11 PROCEDURE — 80053 COMPREHEN METABOLIC PANEL: CPT | Performed by: NURSE PRACTITIONER

## 2024-06-11 PROCEDURE — 1159F MED LIST DOCD IN RCRD: CPT | Mod: CPTII,,, | Performed by: NURSE PRACTITIONER

## 2024-06-11 PROCEDURE — 86762 RUBELLA ANTIBODY: CPT | Performed by: NURSE PRACTITIONER

## 2024-06-11 PROCEDURE — 99999 PR PBB SHADOW E&M-EST. PATIENT-LVL IV: CPT | Mod: PBBFAC,,, | Performed by: NURSE PRACTITIONER

## 2024-06-11 PROCEDURE — 36415 COLL VENOUS BLD VENIPUNCTURE: CPT | Mod: PN | Performed by: NURSE PRACTITIONER

## 2024-06-11 PROCEDURE — 3080F DIAST BP >= 90 MM HG: CPT | Mod: CPTII,,, | Performed by: NURSE PRACTITIONER

## 2024-06-11 PROCEDURE — 86765 RUBEOLA ANTIBODY: CPT | Performed by: NURSE PRACTITIONER

## 2024-06-11 PROCEDURE — 3044F HG A1C LEVEL LT 7.0%: CPT | Mod: CPTII,,, | Performed by: NURSE PRACTITIONER

## 2024-06-11 PROCEDURE — 99204 OFFICE O/P NEW MOD 45 MIN: CPT | Mod: S$PBB,,, | Performed by: NURSE PRACTITIONER

## 2024-06-11 PROCEDURE — 86803 HEPATITIS C AB TEST: CPT | Performed by: NURSE PRACTITIONER

## 2024-06-11 PROCEDURE — 86735 MUMPS ANTIBODY: CPT | Performed by: NURSE PRACTITIONER

## 2024-06-11 PROCEDURE — 84443 ASSAY THYROID STIM HORMONE: CPT | Performed by: NURSE PRACTITIONER

## 2024-06-11 PROCEDURE — 80061 LIPID PANEL: CPT | Performed by: NURSE PRACTITIONER

## 2024-06-11 PROCEDURE — 3008F BODY MASS INDEX DOCD: CPT | Mod: CPTII,,, | Performed by: NURSE PRACTITIONER

## 2024-06-11 PROCEDURE — 3074F SYST BP LT 130 MM HG: CPT | Mod: CPTII,,, | Performed by: NURSE PRACTITIONER

## 2024-06-11 PROCEDURE — 1160F RVW MEDS BY RX/DR IN RCRD: CPT | Mod: CPTII,,, | Performed by: NURSE PRACTITIONER

## 2024-06-11 PROCEDURE — 84439 ASSAY OF FREE THYROXINE: CPT | Performed by: NURSE PRACTITIONER

## 2024-06-11 PROCEDURE — 99214 OFFICE O/P EST MOD 30 MIN: CPT | Mod: PBBFAC,PN | Performed by: NURSE PRACTITIONER

## 2024-06-11 NOTE — PROGRESS NOTES
Subjective:       Patient ID: Ernie Hunter is a 29 y.o. male.    Chief Complaint: Establish Care (Pt is here to establish care. Need vaccines for school.)      History of Present Illness:   Ernie Hunter 29 y.o. male presents today to address care gaps and establish care. Denies any other problems or concerns at this time. Additionally, patient is requesting immunization documentation but does not have shot record from [place]. Will obtain labs today and have patient to follow up in 1 week or sooner if needed. Treatment options and alternatives were discussed with the patient. Patient provided opportunity to ask additional questions.  All questions were answered. Voices understanding and acceptance of this advice. Instructed to call back if any further questions or concerns.     Past Medical History:   Diagnosis Date    Attention deficit hyperactivity disorder (ADHD), predominantly inattentive type     Depression     Insomnia     Mild intermittent asthma, uncomplicated     Prediabetes      Family History   Problem Relation Name Age of Onset    Hypertension Mother      Migraines Mother      Depression Mother      Anxiety disorder Mother      ADD / ADHD Mother      Hyperlipidemia Father      Hypertension Father      Heart attack Father          prior to 50    Depression Sister      Anxiety disorder Sister      ADD / ADHD Brother      Heart failure Maternal Grandmother      Hypertension Maternal Grandmother      Hyperlipidemia Maternal Grandmother      Diabetes Maternal Grandmother      Diabetes Paternal Grandfather      Hyperlipidemia Paternal Grandfather      Hypertension Paternal Grandfather      Arthritis Paternal Grandfather      Gout Paternal Grandfather       Social History     Socioeconomic History    Marital status: Single   Tobacco Use    Smoking status: Never    Smokeless tobacco: Never   Substance and Sexual Activity    Alcohol use: Yes     Outpatient Encounter Medications as of  6/11/2024   Medication Sig Dispense Refill    dextroamphetamine-amphetamine (ADDERALL XR) 10 MG 24 hr capsule Take 10 mg by mouth every morning. (Patient not taking: Reported on 6/11/2024)      metFORMIN (GLUCOPHAGE-XR) 500 MG ER 24hr tablet Take 1 tablet (500 mg total) by mouth daily with breakfast. 90 tablet 3    triamcinolone acetonide 0.1% (KENALOG) 0.1 % cream AAA bid (Patient not taking: Reported on 6/11/2024) 454 g 0    urea (CARMOL) 40 % Crea Apply topically once daily. (Patient not taking: Reported on 6/11/2024) 85 g 2    urea (CARMOL) 40 % Crea Apply topically 2 (two) times daily as directed (Patient not taking: Reported on 6/11/2024) 85 g 0    [DISCONTINUED] methylphenidate HCl (RITALIN) 20 MG tablet Take 1 tablet twice daily (Patient not taking: Reported on 6/11/2024) 30 tablet 0    [DISCONTINUED] methylphenidate HCl (RITALIN) 20 MG tablet Take 1 tablet twice daily (Patient not taking: Reported on 6/11/2024) 60 tablet 0    [DISCONTINUED] methylphenidate HCl (RITALIN) 20 MG tablet Take 1 tablet twice daily (Patient not taking: Reported on 6/11/2024) 30 tablet 0    [DISCONTINUED] sertraline (ZOLOFT) 100 MG tablet Take 1 tablet daily. (Patient not taking: Reported on 6/11/2024) 30 tablet 3    [DISCONTINUED] WELLBUTRIN  mg TBSR 12 hr tablet Take 100 mg by mouth every morning. (Patient not taking: Reported on 6/11/2024)       No facility-administered encounter medications on file as of 6/11/2024.       Review of Systems   Constitutional:  Negative for appetite change, chills and fever.   HENT:  Negative for ear pain, sinus pressure, sore throat and trouble swallowing.    Eyes:  Negative for visual disturbance.   Respiratory:  Negative for shortness of breath.    Cardiovascular:  Negative for chest pain.   Gastrointestinal:  Negative for abdominal pain, diarrhea, nausea and vomiting.   Endocrine: Negative for cold intolerance, polyphagia and polyuria.   Genitourinary:  Negative for decreased urine volume  "and dysuria.   Musculoskeletal:  Negative for back pain.   Skin:  Negative for rash.   Allergic/Immunologic: Negative for environmental allergies and food allergies.   Neurological:  Negative for dizziness, tremors, weakness and numbness.   Hematological:  Does not bruise/bleed easily.   Psychiatric/Behavioral:  Negative for confusion and hallucinations. The patient is not nervous/anxious and is not hyperactive.    All other systems reviewed and are negative.      Objective:      BP (!) 122/96 (BP Location: Right arm, Patient Position: Sitting)   Pulse 76   Temp 98.6 °F (37 °C)   Ht 5' 11" (1.803 m)   Wt (!) 190 kg (418 lb 12.8 oz)   SpO2 99%   BMI 58.41 kg/m²   Physical Exam  Constitutional:       Appearance: He is well-developed.   HENT:      Head: Normocephalic.      Right Ear: Tympanic membrane normal.      Left Ear: Tympanic membrane normal.      Nose: Nose normal.   Eyes:      Pupils: Pupils are equal, round, and reactive to light.   Cardiovascular:      Rate and Rhythm: Normal rate.      Heart sounds: Normal heart sounds.   Pulmonary:      Effort: Pulmonary effort is normal.      Breath sounds: Normal breath sounds.   Abdominal:      General: Bowel sounds are normal.      Palpations: Abdomen is soft.   Musculoskeletal:         General: Normal range of motion.   Skin:     General: Skin is warm and dry.   Neurological:      Mental Status: He is alert and oriented to person, place, and time.   Psychiatric:         Behavior: Behavior normal.         Results for orders placed or performed in visit on 09/13/22   RHEUMATOID FACTOR   Result Value Ref Range    Rheumatoid Factor <13.0 0.0 - 15.0 IU/mL   CELESTINE by IFA, w/Rflx   Result Value Ref Range    CELESTINE Negative <1:80 Negative <1:80   HEMOGLOBIN A1C   Result Value Ref Range    Hemoglobin A1C 5.8 (H) 4.0 - 5.6 %    Estimated Avg Glucose 120 68 - 131 mg/dL     Assessment:       1. General medical exam    2. Encounter for hepatitis C screening test for low risk " patient    3. Screening for diabetes mellitus    4. Encounter for lipid screening for cardiovascular disease    5. Depression with anxiety    6. Screening for HIV (human immunodeficiency virus)        Plan:   Ernie was seen today for establish care.    Diagnoses and all orders for this visit:    General medical exam  -     CBC Auto Differential; Future  -     Comprehensive Metabolic Panel; Future  -     Mumps, IgG Screen; Future  -     Rubeola antibody IgG; Future  -     Rubella antibody, IgG; Future    Encounter for hepatitis C screening test for low risk patient    Screening for diabetes mellitus  -     Hemoglobin A1C; Future  -     TSH; Future  -     T3, Free; Future  -     T4, Free; Future  -     Hepatitis C Antibody; Future    Encounter for lipid screening for cardiovascular disease  -     Lipid Panel; Future    Depression with anxiety  -     Ambulatory referral/consult to Primary Care Behavioral Health (Non-Opioids); Future    Screening for HIV (human immunodeficiency virus)  -     HIV 1/2 Ag/Ab (4th Gen); Future              [facility]   [address]  [place] [address]  Office [phone]  Fax [phone]

## 2024-06-12 LAB
MUMPS IGG INTERPRETATION: POSITIVE
MUMPS IGG SCREEN: 94.6 AU/ML
RUBEOLA IGG ANTIBODY: >300 AU/ML
RUBEOLA INTERPRETATION: POSITIVE
RUBV IGG SER-ACNC: 22.6 IU/ML
RUBV IGG SER-IMP: REACTIVE
T3FREE SERPL-MCNC: 3.2 PG/ML (ref 2.3–4.2)
T4 FREE SERPL-MCNC: 0.98 NG/DL (ref 0.71–1.51)
TSH SERPL DL<=0.005 MIU/L-ACNC: 1.16 UIU/ML (ref 0.4–4)

## 2024-06-14 ENCOUNTER — TELEPHONE (OUTPATIENT)
Dept: PSYCHIATRY | Facility: CLINIC | Age: 29
End: 2024-06-14
Payer: MEDICAID

## 2024-06-18 ENCOUNTER — OFFICE VISIT (OUTPATIENT)
Dept: PSYCHIATRY | Facility: CLINIC | Age: 29
End: 2024-06-18
Payer: MEDICAID

## 2024-06-18 DIAGNOSIS — F41.1 GENERALIZED ANXIETY DISORDER: ICD-10-CM

## 2024-06-18 DIAGNOSIS — F90.9 ATTENTION DEFICIT HYPERACTIVITY DISORDER (ADHD), UNSPECIFIED ADHD TYPE: ICD-10-CM

## 2024-06-18 PROCEDURE — 99214 OFFICE O/P EST MOD 30 MIN: CPT | Mod: 95,,, | Performed by: PSYCHIATRY & NEUROLOGY

## 2024-06-18 PROCEDURE — 3044F HG A1C LEVEL LT 7.0%: CPT | Mod: CPTII,95,, | Performed by: PSYCHIATRY & NEUROLOGY

## 2024-06-18 RX ORDER — METHYLPHENIDATE HYDROCHLORIDE 20 MG/1
TABLET ORAL
Qty: 30 TABLET | Refills: 0 | Status: SHIPPED | OUTPATIENT
Start: 2024-08-17

## 2024-06-18 RX ORDER — SERTRALINE HYDROCHLORIDE 100 MG/1
TABLET, FILM COATED ORAL
Qty: 30 TABLET | Refills: 3 | Status: SHIPPED | OUTPATIENT
Start: 2024-06-18

## 2024-06-18 RX ORDER — METHYLPHENIDATE HYDROCHLORIDE 20 MG/1
TABLET ORAL
Qty: 30 TABLET | Refills: 0 | Status: SHIPPED | OUTPATIENT
Start: 2024-06-18

## 2024-06-18 RX ORDER — METHYLPHENIDATE HYDROCHLORIDE 20 MG/1
TABLET ORAL
Qty: 60 TABLET | Refills: 0 | Status: SHIPPED | OUTPATIENT
Start: 2024-07-18

## 2024-06-18 NOTE — PROGRESS NOTES
"Outpatient Psychiatry Follow-up Visit (MD/NP)    6/18/2024    Ernie Hunter, a 28 y.o. male, presenting for follow-up visit. Met with patient.    Reason for Encounter: follow-up,     Interval Hx: patient seen and interviewed for follow-up, last seen about five months ago. Lost job in March. Blamed by a coworker who was already getting fired.   Graduated in May  Travelled with family to [place] - three sibs graduating from different schools. Has a new job - working at [employer]. [employer] for local sports/ schools. Started working on Friday. Is a team . Anticipates making more money. Has been participating in outpatient psychotherapy with Mr. Burroughs. Is switching therapists. Health is mostly the same. Has gained some weight. Trying to lose. Planning to get a gym membership. Seeing a new PCP. No new medications.   Meds work when is available. Helps attention and moods.     Background: Pt is a 27 year man who presents for establishment of care, is participating in outpt psychotherapy with Omar Heike since June of this year, last seen twelve days prior to this visit. Has seen Dr. Palomares. Has prior history of adhd.     Reports that he feels sad, alone, irritable, low motivation, "Feel like giving up". Denies timoteo passive or active suicidal intent or plan, "but I'd like to be in a coma". "Sometimes I punch stuff" when upset. No history of sander, no psychosis.     From psychotherapy initial visit: Pt presents... due to depression & anxiety. He is referred by Dr. Palomares. He used to take ADD medication, but he felt like he didn't have any feelings. He struggles with motivation when he is feeling down. He moved to  from [place] in Dec. 2021. He lives with his paternal aunt, paternal GM, & his 6 y/o 1st cousin. His aunt is a nurse at [employer]. His parents  when he was 8. He lived with his mother. He has a younger full sister, who is in college. He has 2 younger siblings from his mother & step " "father. His step father  unexpectedly several years ago. His younger half sister struggled with depression. He had to rescue her from the middle of traffic. He has worked fast food in the past. He would like to pursue a degree/ work in graphic art. 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 He does [employer] for side money. Educated the pt about the function & purpose of counseling. Note that a referral to a prescriber for medication management can be arranged if needed. Praise the pt for trying to do something different than what he has done in the past. He is considering attending [employer] when he establishes residence in [place]. He found out that the HQ for [employer] is in . He hopes to investigate opportunities with the business. He has been spending a good bit of time out of the home. He doesn't like being at his aunt's house because his GM reminds him of his mother.    Confirms the above history at this visit. Has been taking wellbutrin for depression, atomoxetine for attention/concentration.     Psych Hx: Diagnosed with adhd as a child, starting at around age 7, testing and treatment through pediatrician. continued until about 17 years old. Didn't like it, felt "like a zombie", "sat like a bump on a log until you told me what to do". Decided to stop medication to exercise autonomy. However, has continued to notice ongoing problems with attention and concentration, difficulty with task completion, efficiency of performing tasks, losing track of conversation, difficulty organizing complex activitiies. Never had hyperactivity. [facility], results pending. No paranoia or other delusions. No AVH.     Family Hx: mother - depression, anxiety; suspects all siblings (has 3 younger sibs).     Past Medical History:   Diagnosis Date    Attention deficit hyperactivity disorder (ADHD), predominantly inattentive type     Depression     Insomnia  "    Mild intermittent asthma, uncomplicated     Prediabetes      Social Hx: from [place]. No gestational, birth, or early life health complications. Normal milestones. Parents  when he was 8. Mom remarried and this new   in 2019 from liver CA. Paternal cousin, grandmother, grandfather, 2 great-grandmothers, uncle are losses that have affected him in his life. HS graduate, wants to attend higher education.     Review Of Systems:     GENERAL:  No weight gain or loss  SKIN:  No rashes or lacerations  HEAD:  No headaches  CHEST:  No shortness of breath, hyperventilation or cough  CARDIOVASCULAR:  No tachycardia or chest pain  ABDOMEN:  No nausea, vomiting, pain, constipation or diarrhea  URINARY:  No frequency, dysuria or sexual dysfunction  ENDOCRINE:  No polydipsia, polyuria  MUSCULOSKELETAL:  No pain or stiffness of the joints  NEUROLOGIC:  No weakness, sensory changes, seizures, confusion, memory loss, tremor or other abnormal movements    Current Evaluation:     Nutritional Screening: Considering the patient's height and weight, medications, medical history and preferences, should a referral be made to the dietitian? no    Constitutional  Vitals:  Most recent vital signs, dated less than 90 days prior to this appointment, were reviewed.    There were no vitals filed for this visit.         General:  unremarkable, age appropriate     Musculoskeletal  Muscle Strength/Tone:  no tremor, no tic   Gait & Station:  non-ataxic     Psychiatric  Appearance: casually dressed & groomed;   Behavior: calm,   Cooperation: cooperative with assessment  Speech: normal rate, volume, tone  Thought Process: linear, goal-directed  Thought Content: No suicidal or homicidal ideation; no delusions  Affect: depressed   Mood: depressed  Perceptions: No auditory or visual hallucinations  Level of Consciousness: alert throughout interview  Insight: fair  Cognition: Oriented to person, place, time, & situation  Memory: no apparent  deficits to general clinical interview; not formally assessed  Attention/Concentration: no apparent deficits to general clinical interview; not formally assessed  Fund of Knowledge: average by vocabulary/education    Laboratory Data  Lab Visit on 06/11/2024   Component Date Value Ref Range Status    WBC 06/11/2024 5.21  3.90 - 12.70 K/uL Final    RBC 06/11/2024 5.65  4.60 - 6.20 M/uL Final    Hemoglobin 06/11/2024 16.5  14.0 - 18.0 g/dL Final    Hematocrit 06/11/2024 50.7  40.0 - 54.0 % Final    MCV 06/11/2024 90  82 - 98 fL Final    MCH 06/11/2024 29.2  27.0 - 31.0 pg Final    MCHC 06/11/2024 32.5  32.0 - 36.0 g/dL Final    RDW 06/11/2024 13.0  11.5 - 14.5 % Final    Platelets 06/11/2024 305  150 - 450 K/uL Final    MPV 06/11/2024 11.6  9.2 - 12.9 fL Final    Immature Granulocytes 06/11/2024 0.2  0.0 - 0.5 % Final    Gran # (ANC) 06/11/2024 2.8  1.8 - 7.7 K/uL Final    Immature Grans (Abs) 06/11/2024 0.01  0.00 - 0.04 K/uL Final    Lymph # 06/11/2024 1.7  1.0 - 4.8 K/uL Final    Mono # 06/11/2024 0.6  0.3 - 1.0 K/uL Final    Eos # 06/11/2024 0.1  0.0 - 0.5 K/uL Final    Baso # 06/11/2024 0.01  0.00 - 0.20 K/uL Final    nRBC 06/11/2024 0  0 /100 WBC Final    Gran % 06/11/2024 54.2  38.0 - 73.0 % Final    Lymph % 06/11/2024 32.2  18.0 - 48.0 % Final    Mono % 06/11/2024 11.1  4.0 - 15.0 % Final    Eosinophil % 06/11/2024 2.1  0.0 - 8.0 % Final    Basophil % 06/11/2024 0.2  0.0 - 1.9 % Final    Differential Method 06/11/2024 Automated   Final    Sodium 06/11/2024 141  136 - 145 mmol/L Final    Potassium 06/11/2024 3.8  3.5 - 5.1 mmol/L Final    Chloride 06/11/2024 105  95 - 110 mmol/L Final    CO2 06/11/2024 22 (L)  23 - 29 mmol/L Final    Glucose 06/11/2024 93  70 - 110 mg/dL Final    BUN 06/11/2024 9  6 - 20 mg/dL Final    Creatinine 06/11/2024 1.0  0.5 - 1.4 mg/dL Final    Calcium 06/11/2024 9.5  8.7 - 10.5 mg/dL Final    Total Protein 06/11/2024 7.5  6.0 - 8.4 g/dL Final    Albumin 06/11/2024 3.9  3.5 - 5.2 g/dL  Final    Total Bilirubin 06/11/2024 0.8  0.1 - 1.0 mg/dL Final    Alkaline Phosphatase 06/11/2024 60  55 - 135 U/L Final    AST 06/11/2024 22  10 - 40 U/L Final    ALT 06/11/2024 37  10 - 44 U/L Final    eGFR 06/11/2024 >60.0  >60 mL/min/1.73 m^2 Final    Anion Gap 06/11/2024 14  8 - 16 mmol/L Final    Cholesterol 06/11/2024 173  120 - 199 mg/dL Final    Triglycerides 06/11/2024 61  30 - 150 mg/dL Final    HDL 06/11/2024 40  40 - 75 mg/dL Final    LDL Cholesterol 06/11/2024 120.8  63.0 - 159.0 mg/dL Final    HDL/Cholesterol Ratio 06/11/2024 23.1  20.0 - 50.0 % Final    Total Cholesterol/HDL Ratio 06/11/2024 4.3  2.0 - 5.0 Final    Non-HDL Cholesterol 06/11/2024 133  mg/dL Final    Hemoglobin A1C 06/11/2024 5.8 (H)  4.0 - 5.6 % Final    Estimated Avg Glucose 06/11/2024 120  68 - 131 mg/dL Final    TSH 06/11/2024 1.158  0.400 - 4.000 uIU/mL Final    T3, Free 06/11/2024 3.2  2.3 - 4.2 pg/mL Final    Free T4 06/11/2024 0.98  0.71 - 1.51 ng/dL Final    HIV 1/2 Ag/Ab 06/11/2024 Non-reactive  Non-reactive Final    Hepatitis C Ab 06/11/2024 Non-reactive  Non-reactive Final    Mumps IgG Screen 06/11/2024 94.60  AU/ml Final    Mumps IgG Interpretation 06/11/2024 Positive   Final    Rubeola IgG 06/11/2024 >300.00  AU/ml Final    Rubeola Interpretation 06/11/2024 Positive   Final    Rubella IgG Antibodies 06/11/2024 22.6  >=10.0 IU/mL Final    Rubella Immune Status 06/11/2024 Reactive   Final     Medications  Outpatient Encounter Medications as of 6/18/2024   Medication Sig Dispense Refill    dextroamphetamine-amphetamine (ADDERALL XR) 10 MG 24 hr capsule Take 10 mg by mouth every morning. (Patient not taking: Reported on 6/11/2024)      metFORMIN (GLUCOPHAGE-XR) 500 MG ER 24hr tablet Take 1 tablet (500 mg total) by mouth daily with breakfast. 90 tablet 3    methylphenidate HCl (RITALIN) 20 MG tablet Take 1 tablet twice daily (Patient not taking: Reported on 6/11/2024) 30 tablet 0    methylphenidate HCl (RITALIN) 20 MG tablet  Take 1 tablet twice daily (Patient not taking: Reported on 6/11/2024) 60 tablet 0    methylphenidate HCl (RITALIN) 20 MG tablet Take 1 tablet twice daily (Patient not taking: Reported on 6/11/2024) 30 tablet 0    sertraline (ZOLOFT) 100 MG tablet Take 1 tablet daily. (Patient not taking: Reported on 6/11/2024) 30 tablet 3    triamcinolone acetonide 0.1% (KENALOG) 0.1 % cream AAA bid (Patient not taking: Reported on 6/11/2024) 454 g 0    urea (CARMOL) 40 % Crea Apply topically once daily. (Patient not taking: Reported on 6/11/2024) 85 g 2    urea (CARMOL) 40 % Crea Apply topically 2 (two) times daily as directed (Patient not taking: Reported on 6/11/2024) 85 g 0    WELLBUTRIN  mg TBSR 12 hr tablet Take 100 mg by mouth every morning. (Patient not taking: Reported on 6/11/2024)       No facility-administered encounter medications on file as of 6/18/2024.     Assessment - Diagnosis - Goals:     Impression: 28 year old man with depression, modest benefit from wellbutrin treatment for depression. Brief atomoxetine for adhd in past. Participating in psychotherapy. Insurance and access to meds have limited consistent benefit, but meds help. Starting new therapy.     Treatment Goals:  Specify outcomes written in observable, behavioral terms: reduce depression by self-report.     Treatment Plan/Recommendations:     Continue methylphenidate. Sertraline for depression.   Continue psychotherapy.   Discussed risks, benefits, and alternatives to treatment plan documented above with patient. I answered all patient questions related to this plan and patient expressed understanding and agreement.     Return to Clinic: 2 months    NAYANA Abernathy MD  Psychiatry, [facility]

## 2024-06-19 ENCOUNTER — PATIENT MESSAGE (OUTPATIENT)
Dept: PRIMARY CARE CLINIC | Facility: CLINIC | Age: 29
End: 2024-06-19
Payer: MEDICAID

## 2024-06-26 ENCOUNTER — PATIENT MESSAGE (OUTPATIENT)
Dept: PRIMARY CARE CLINIC | Facility: CLINIC | Age: 29
End: 2024-06-26
Payer: MEDICAID

## 2024-07-25 ENCOUNTER — TELEPHONE (OUTPATIENT)
Dept: PSYCHIATRY | Facility: CLINIC | Age: 29
End: 2024-07-25
Payer: MEDICAID

## 2024-07-25 NOTE — TELEPHONE ENCOUNTER
----- Message from Haydee Chambers sent at 7/25/2024  9:58 AM CDT -----  Contact: pt  Type:  Sooner Apoointment Request    Caller is requesting a sooner appointment.  Caller declined first available appointment listed below.  Caller will not accept being placed on the waitlist and is requesting a message be sent to doctor.  Name of Caller: pt  When is the first available appointment? Pt missed his appt today @ 9am and need to azul  Symptoms: consult  Would the patient rather a call back or [facility]? [facility]  Best Call Back Number: [phone]  Additional Information:

## 2024-07-30 ENCOUNTER — TELEPHONE (OUTPATIENT)
Dept: PSYCHIATRY | Facility: CLINIC | Age: 29
End: 2024-07-30
Payer: MEDICAID

## 2024-08-01 ENCOUNTER — OFFICE VISIT (OUTPATIENT)
Dept: PSYCHIATRY | Facility: CLINIC | Age: 29
End: 2024-08-01
Payer: MEDICAID

## 2024-08-01 DIAGNOSIS — F41.1 GENERALIZED ANXIETY DISORDER: Primary | ICD-10-CM

## 2024-08-01 DIAGNOSIS — F90.9 ATTENTION DEFICIT HYPERACTIVITY DISORDER (ADHD), UNSPECIFIED ADHD TYPE: ICD-10-CM

## 2024-08-01 DIAGNOSIS — F33.9 EPISODE OF RECURRENT MAJOR DEPRESSIVE DISORDER, UNSPECIFIED DEPRESSION EPISODE SEVERITY: ICD-10-CM

## 2024-08-01 PROCEDURE — 90791 PSYCH DIAGNOSTIC EVALUATION: CPT | Mod: ,,, | Performed by: SOCIAL WORKER

## 2024-08-01 PROCEDURE — 3044F HG A1C LEVEL LT 7.0%: CPT | Mod: CPTII,,, | Performed by: SOCIAL WORKER

## 2024-09-23 ENCOUNTER — TELEPHONE (OUTPATIENT)
Dept: PSYCHIATRY | Facility: CLINIC | Age: 29
End: 2024-09-23
Payer: MEDICAID

## 2024-09-25 ENCOUNTER — OFFICE VISIT (OUTPATIENT)
Dept: PSYCHIATRY | Facility: CLINIC | Age: 29
End: 2024-09-25
Payer: MEDICAID

## 2024-09-25 ENCOUNTER — PATIENT MESSAGE (OUTPATIENT)
Dept: PSYCHIATRY | Facility: CLINIC | Age: 29
End: 2024-09-25

## 2024-09-25 DIAGNOSIS — F90.9 ATTENTION DEFICIT HYPERACTIVITY DISORDER (ADHD), UNSPECIFIED ADHD TYPE: ICD-10-CM

## 2024-09-25 DIAGNOSIS — F41.1 GENERALIZED ANXIETY DISORDER: ICD-10-CM

## 2024-09-25 PROCEDURE — 99214 OFFICE O/P EST MOD 30 MIN: CPT | Mod: 95,,, | Performed by: PSYCHIATRY & NEUROLOGY

## 2024-09-25 PROCEDURE — 3044F HG A1C LEVEL LT 7.0%: CPT | Mod: CPTII,95,, | Performed by: PSYCHIATRY & NEUROLOGY

## 2024-09-25 RX ORDER — METHYLPHENIDATE HYDROCHLORIDE 20 MG/1
TABLET ORAL
Qty: 30 TABLET | Refills: 0 | Status: SHIPPED | OUTPATIENT
Start: 2024-11-24

## 2024-09-25 RX ORDER — METHYLPHENIDATE HYDROCHLORIDE 20 MG/1
TABLET ORAL
Qty: 60 TABLET | Refills: 0 | Status: SHIPPED | OUTPATIENT
Start: 2024-10-25

## 2024-09-25 RX ORDER — SERTRALINE HYDROCHLORIDE 100 MG/1
TABLET, FILM COATED ORAL
Qty: 30 TABLET | Refills: 3 | Status: SHIPPED | OUTPATIENT
Start: 2024-09-25

## 2024-09-25 RX ORDER — METHYLPHENIDATE HYDROCHLORIDE 20 MG/1
TABLET ORAL
Qty: 30 TABLET | Refills: 0 | Status: SHIPPED | OUTPATIENT
Start: 2024-09-25

## 2024-09-25 NOTE — PROGRESS NOTES
"Outpatient Psychiatry Follow-up Visit (MD/NP)    2024    Ernie Hunter, a 29 y.o. male, presenting for follow-up visit. Met with patient.    Reason for Encounter: follow-up,     Interval Hx: patient seen and interviewed for follow-up, last seen about three months ago. Lost job in March. Blamed by a coworker who was already getting fired.   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 Adjusting the workload and balancing work/school. New job during , working steadily. Working 3-4 days/week. Being more active. Still in contemplation about more exercise.   No new health problems.   No new medications.   Problems with consistent medication adherence.     Background: Pt is a 27 year man who presents for establishment of care, is participating in outpt psychotherapy with Omar Burroughs since  of this year, last seen twelve days prior to this visit. Has seen Dr. Palomares. Has prior history of adhd.     Reports that he feels sad, alone, irritable, low motivation, "Feel like giving up". Denies timoteo passive or active suicidal intent or plan, "but I'd like to be in a coma". "Sometimes I punch stuff" when upset. No history of sander, no psychosis.     From psychotherapy initial visit: Pt presents... due to depression & anxiety. He is referred by Dr. Palomares. He used to take ADD medication, but he felt like he didn't have any feelings. He struggles with motivation when he is feeling down. He moved to  from [place] in Dec. 2021. He lives with his paternal aunt, paternal GM, & his 8 y/o 1st cousin. His aunt is a nurse at [employer]. His parents  when he was 8. He lived with his mother. He has a younger full sister, who is in college. He has 2 younger siblings from his mother & step father. His step father  unexpectedly several years ago. His younger half sister struggled with depression. He had to rescue her from the middle of traffic. He has worked fast food in the past. He would like " "to pursue a degree/ work in graphic art. 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 Educated the pt about the function & purpose of counseling. Note that a referral to a prescriber for medication management can be arranged if needed. Praise the pt for trying to do something different than what he has done in the past. He is considering attending [employer] [facility] [place] when he establishes residence in [place]. He found out that the HQ for [employer] is in . He hopes to investigate opportunities with the business. He has been spending a good bit of time out of the home. He doesn't like being at his aunt's house because his GM reminds him of his mother.    Confirms the above history at this visit. Has been taking wellbutrin for depression, atomoxetine for attention/concentration.     Psych Hx: Diagnosed with adhd as a child, starting at around age 7, testing and treatment through pediatrician. continued until about 17 years old. Didn't like it, felt "like a zombie", "sat like a bump on a log until you told me what to do". Decided to stop medication to exercise autonomy. However, has continued to notice ongoing problems with attention and concentration, difficulty with task completion, efficiency of performing tasks, losing track of conversation, difficulty organizing complex activitiies. Never had hyperactivity. [facility], results pending. No paranoia or other delusions. No AVH.     Family Hx: mother - depression, anxiety; suspects all siblings (has 3 younger sibs).     Past Medical History:   Diagnosis Date    Attention deficit hyperactivity disorder (ADHD), predominantly inattentive type     Depression     Insomnia     Mild intermittent asthma, uncomplicated     Prediabetes      Social Hx: from [place]. No gestational, birth, or early life health complications. Normal milestones. Parents  when he was 8. Mom remarried and " this new   in 2019 from liver CA. Paternal cousin, grandmother, grandfather, 2 great-grandmothers, uncle are losses that have affected him in his life. HS graduate, wants to attend higher education.     Review Of Systems:     GENERAL:  No weight gain or loss  SKIN:  No rashes or lacerations  HEAD:  No headaches  CHEST:  No shortness of breath, hyperventilation or cough  CARDIOVASCULAR:  No tachycardia or chest pain  ABDOMEN:  No nausea, vomiting, pain, constipation or diarrhea  URINARY:  No frequency, dysuria or sexual dysfunction  ENDOCRINE:  No polydipsia, polyuria  MUSCULOSKELETAL:  No pain or stiffness of the joints  NEUROLOGIC:  No weakness, sensory changes, seizures, confusion, memory loss, tremor or other abnormal movements    Current Evaluation:     Nutritional Screening: Considering the patient's height and weight, medications, medical history and preferences, should a referral be made to the dietitian? no    Constitutional  Vitals:  Most recent vital signs, dated less than 90 days prior to this appointment, were reviewed.    There were no vitals filed for this visit.         General:  unremarkable, age appropriate     Musculoskeletal  Muscle Strength/Tone:  no tremor, no tic   Gait & Station:  non-ataxic     Psychiatric  Appearance: casually dressed & groomed;   Behavior: calm,   Cooperation: cooperative with assessment  Speech: normal rate, volume, tone  Thought Process: linear, goal-directed  Thought Content: No suicidal or homicidal ideation; no delusions  Affect: depressed   Mood: depressed  Perceptions: No auditory or visual hallucinations  Level of Consciousness: alert throughout interview  Insight: fair  Cognition: Oriented to person, place, time, & situation  Memory: no apparent deficits to general clinical interview; not formally assessed  Attention/Concentration: no apparent deficits to general clinical interview; not formally assessed  Fund of Knowledge: average by  vocabulary/education    Laboratory Data  No visits with results within 1 Month(s) from this visit.   Latest known visit with results is:   Lab Visit on 06/11/2024   Component Date Value Ref Range Status    WBC 06/11/2024 5.21  3.90 - 12.70 K/uL Final    RBC 06/11/2024 5.65  4.60 - 6.20 M/uL Final    Hemoglobin 06/11/2024 16.5  14.0 - 18.0 g/dL Final    Hematocrit 06/11/2024 50.7  40.0 - 54.0 % Final    MCV 06/11/2024 90  82 - 98 fL Final    MCH 06/11/2024 29.2  27.0 - 31.0 pg Final    MCHC 06/11/2024 32.5  32.0 - 36.0 g/dL Final    RDW 06/11/2024 13.0  11.5 - 14.5 % Final    Platelets 06/11/2024 305  150 - 450 K/uL Final    MPV 06/11/2024 11.6  9.2 - 12.9 fL Final    Immature Granulocytes 06/11/2024 0.2  0.0 - 0.5 % Final    Gran # (ANC) 06/11/2024 2.8  1.8 - 7.7 K/uL Final    Immature Grans (Abs) 06/11/2024 0.01  0.00 - 0.04 K/uL Final    Lymph # 06/11/2024 1.7  1.0 - 4.8 K/uL Final    Mono # 06/11/2024 0.6  0.3 - 1.0 K/uL Final    Eos # 06/11/2024 0.1  0.0 - 0.5 K/uL Final    Baso # 06/11/2024 0.01  0.00 - 0.20 K/uL Final    nRBC 06/11/2024 0  0 /100 WBC Final    Gran % 06/11/2024 54.2  38.0 - 73.0 % Final    Lymph % 06/11/2024 32.2  18.0 - 48.0 % Final    Mono % 06/11/2024 11.1  4.0 - 15.0 % Final    Eosinophil % 06/11/2024 2.1  0.0 - 8.0 % Final    Basophil % 06/11/2024 0.2  0.0 - 1.9 % Final    Differential Method 06/11/2024 Automated   Final    Sodium 06/11/2024 141  136 - 145 mmol/L Final    Potassium 06/11/2024 3.8  3.5 - 5.1 mmol/L Final    Chloride 06/11/2024 105  95 - 110 mmol/L Final    CO2 06/11/2024 22 (L)  23 - 29 mmol/L Final    Glucose 06/11/2024 93  70 - 110 mg/dL Final    BUN 06/11/2024 9  6 - 20 mg/dL Final    Creatinine 06/11/2024 1.0  0.5 - 1.4 mg/dL Final    Calcium 06/11/2024 9.5  8.7 - 10.5 mg/dL Final    Total Protein 06/11/2024 7.5  6.0 - 8.4 g/dL Final    Albumin 06/11/2024 3.9  3.5 - 5.2 g/dL Final    Total Bilirubin 06/11/2024 0.8  0.1 - 1.0 mg/dL Final    Alkaline Phosphatase  06/11/2024 60  55 - 135 U/L Final    AST 06/11/2024 22  10 - 40 U/L Final    ALT 06/11/2024 37  10 - 44 U/L Final    eGFR 06/11/2024 >60.0  >60 mL/min/1.73 m^2 Final    Anion Gap 06/11/2024 14  8 - 16 mmol/L Final    Cholesterol 06/11/2024 173  120 - 199 mg/dL Final    Triglycerides 06/11/2024 61  30 - 150 mg/dL Final    HDL 06/11/2024 40  40 - 75 mg/dL Final    LDL Cholesterol 06/11/2024 120.8  63.0 - 159.0 mg/dL Final    HDL/Cholesterol Ratio 06/11/2024 23.1  20.0 - 50.0 % Final    Total Cholesterol/HDL Ratio 06/11/2024 4.3  2.0 - 5.0 Final    Non-HDL Cholesterol 06/11/2024 133  mg/dL Final    Hemoglobin A1C 06/11/2024 5.8 (H)  4.0 - 5.6 % Final    Estimated Avg Glucose 06/11/2024 120  68 - 131 mg/dL Final    TSH 06/11/2024 1.158  0.400 - 4.000 uIU/mL Final    T3, Free 06/11/2024 3.2  2.3 - 4.2 pg/mL Final    Free T4 06/11/2024 0.98  0.71 - 1.51 ng/dL Final    HIV 1/2 Ag/Ab 06/11/2024 Non-reactive  Non-reactive Final    Hepatitis C Ab 06/11/2024 Non-reactive  Non-reactive Final    Mumps IgG Screen 06/11/2024 94.60  AU/ml Final    Mumps IgG Interpretation 06/11/2024 Positive   Final    Rubeola IgG 06/11/2024 >300.00  AU/ml Final    Rubeola Interpretation 06/11/2024 Positive   Final    Rubella IgG Antibodies 06/11/2024 22.6  >=10.0 IU/mL Final    Rubella Immune Status 06/11/2024 Reactive   Final     Medications  Outpatient Encounter Medications as of 9/25/2024   Medication Sig Dispense Refill    dextroamphetamine-amphetamine (ADDERALL XR) 10 MG 24 hr capsule Take 10 mg by mouth every morning. (Patient not taking: Reported on 6/11/2024)      metFORMIN (GLUCOPHAGE-XR) 500 MG ER 24hr tablet Take 1 tablet (500 mg total) by mouth daily with breakfast. 90 tablet 3    methylphenidate HCl (RITALIN) 20 MG tablet Take 1 tablet twice daily 30 tablet 0    methylphenidate HCl (RITALIN) 20 MG tablet Take 1 tablet twice daily 60 tablet 0    methylphenidate HCl (RITALIN) 20 MG tablet Take 1 tablet twice daily 30 tablet 0     sertraline (ZOLOFT) 100 MG tablet Take 1 tablet daily. 30 tablet 3    triamcinolone acetonide 0.1% (KENALOG) 0.1 % cream AAA bid (Patient not taking: Reported on 6/11/2024) 454 g 0    urea (CARMOL) 40 % Crea Apply topically once daily. (Patient not taking: Reported on 6/11/2024) 85 g 2    urea (CARMOL) 40 % Crea Apply topically 2 (two) times daily as directed (Patient not taking: Reported on 6/11/2024) 85 g 0     No facility-administered encounter medications on file as of 9/25/2024.     Assessment - Diagnosis - Goals:     Impression: 28 year old man with depression, modest benefit from wellbutrin treatment for depression. Brief atomoxetine for adhd in past. Participating in psychotherapy. Insurance and access to meds have limited consistent benefit, but meds help. Starting new therapy.     Treatment Goals:  Specify outcomes written in observable, behavioral terms: reduce depression by self-report.     Treatment Plan/Recommendations:     Continue methylphenidate. Sertraline for depression.   Continue psychotherapy.   Discussed risks, benefits, and alternatives to treatment plan documented above with patient. I answered all patient questions related to this plan and patient expressed understanding and agreement.     Return to Clinic: 2 months    NAYANA Abernathy MD  Psychiatry, [facility]

## 2024-09-26 ENCOUNTER — TELEPHONE (OUTPATIENT)
Dept: PSYCHIATRY | Facility: CLINIC | Age: 29
End: 2024-09-26
Payer: MEDICAID

## 2024-10-30 ENCOUNTER — TELEPHONE (OUTPATIENT)
Dept: PSYCHIATRY | Facility: CLINIC | Age: 29
End: 2024-10-30
Payer: MEDICAID

## 2024-11-01 ENCOUNTER — OFFICE VISIT (OUTPATIENT)
Dept: PSYCHIATRY | Facility: CLINIC | Age: 29
End: 2024-11-01
Payer: MEDICAID

## 2024-11-01 ENCOUNTER — PATIENT MESSAGE (OUTPATIENT)
Dept: PSYCHIATRY | Facility: CLINIC | Age: 29
End: 2024-11-01
Payer: MEDICAID

## 2024-11-01 DIAGNOSIS — F90.9 ATTENTION DEFICIT HYPERACTIVITY DISORDER (ADHD), UNSPECIFIED ADHD TYPE: ICD-10-CM

## 2024-11-01 DIAGNOSIS — F41.1 GENERALIZED ANXIETY DISORDER: Primary | ICD-10-CM

## 2024-11-01 DIAGNOSIS — F33.0 MDD (MAJOR DEPRESSIVE DISORDER), RECURRENT EPISODE, MILD: ICD-10-CM

## 2024-12-02 ENCOUNTER — PATIENT MESSAGE (OUTPATIENT)
Dept: PSYCHIATRY | Facility: CLINIC | Age: 29
End: 2024-12-02
Payer: MEDICAID

## 2025-01-03 ENCOUNTER — OFFICE VISIT (OUTPATIENT)
Dept: PSYCHIATRY | Facility: CLINIC | Age: 30
End: 2025-01-03
Payer: MEDICAID

## 2025-01-03 DIAGNOSIS — F41.1 GENERALIZED ANXIETY DISORDER: Primary | ICD-10-CM

## 2025-01-03 DIAGNOSIS — F33.0 MDD (MAJOR DEPRESSIVE DISORDER), RECURRENT EPISODE, MILD: ICD-10-CM

## 2025-01-03 DIAGNOSIS — F90.9 ATTENTION DEFICIT HYPERACTIVITY DISORDER (ADHD), UNSPECIFIED ADHD TYPE: ICD-10-CM
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
## 2025-01-23 ENCOUNTER — PATIENT MESSAGE (OUTPATIENT)
Dept: PSYCHIATRY | Facility: CLINIC | Age: 30
End: 2025-01-23
Payer: MEDICAID

## 2025-01-30 ENCOUNTER — PATIENT MESSAGE (OUTPATIENT)
Dept: PSYCHIATRY | Facility: CLINIC | Age: 30
End: 2025-01-30
Payer: MEDICAID

## 2025-04-02 ENCOUNTER — ON-DEMAND VIRTUAL (OUTPATIENT)
Dept: URGENT CARE | Facility: CLINIC | Age: 30
End: 2025-04-02
Payer: MEDICAID

## 2025-04-02 DIAGNOSIS — R51.9 ACUTE NONINTRACTABLE HEADACHE, UNSPECIFIED HEADACHE TYPE: Primary | ICD-10-CM

## 2025-04-03 NOTE — PROGRESS NOTES
Subjective:      Patient ID: Ernie Hunter is a 29 y.o. male.    Vitals:  vitals were not taken for this visit.     Chief Complaint: Headache      Visit Type: TELE AUDIOVISUAL    Patient Location: Home     Present with the patient at the time of consultation: TELEMED PRESENT WITH PATIENT: None    Past Medical History:   Diagnosis Date    Attention deficit hyperactivity disorder (ADHD), predominantly inattentive type     Depression     Insomnia     Mild intermittent asthma, uncomplicated     Prediabetes      History reviewed. No pertinent surgical history.  Review of patient's allergies indicates:   Allergen Reactions    Ibuprofen Other (See Comments)     Abdominal pain     Medications Ordered Prior to Encounter[1]  Family History   Problem Relation Name Age of Onset    Hypertension Mother      Migraines Mother      Depression Mother      Anxiety disorder Mother      ADD / ADHD Mother      Hyperlipidemia Father      Hypertension Father      Heart attack Father          prior to 50    Depression Sister      Anxiety disorder Sister      ADD / ADHD Brother      Heart failure Maternal Grandmother      Hypertension Maternal Grandmother      Hyperlipidemia Maternal Grandmother      Diabetes Maternal Grandmother      Diabetes Paternal Grandfather      Hyperlipidemia Paternal Grandfather      Hypertension Paternal Grandfather      Arthritis Paternal Grandfather      Gout Paternal Grandfather         Medications Ordered                [id number] - [address] [facility] [place] [address]    Telephone: [phone]   Fax: [phone]   Hours: Not open 24 hours                         E-Prescribed (1 of 1)              aspirin-acetaminophen-caffeine 250-250-65 mg (EXCEDRIN MIGRAINE) 250-250-65 mg per tablet    Sig: Take 2 tablets by mouth every 6 (six) hours as needed for Pain.       Start: 4/2/25     Quantity: 40 tablet Refills: 0                            Ohs Peq Odvv Intake    4/2/2025  9:22 PM CDT - Filed by Patient   What is your current physical address in the event of a medical emergency? [address] [place] [address]   Are you able to take your vital signs? No   Please attach any relevant images or files    Is your employer contracted with [facility]? No         28 y/o male with c/o headache behind eyes x3 days. Pt reports started new eye prescription on Friday. Symptoms started the next day.         Gastrointestinal:  Negative for nausea and vomiting.   Neurological:  Positive for headaches. Negative for dizziness, history of vertigo, light-headedness and history of migraines.        Objective:   The physical exam was conducted virtually.  Physical Exam   Constitutional: He is oriented to person, place, and time. He is cooperative. He does not appear ill. No distress. awake  HENT:   Head: Normocephalic and atraumatic.   Abdominal: Normal appearance.   Neurological: He is alert and oriented to person, place, and time.   Psychiatric: His behavior is normal. Judgment normal.       Assessment:     1. Acute nonintractable headache, unspecified headache type        Plan:       Acute nonintractable headache, unspecified headache type  -     aspirin-acetaminophen-caffeine 250-250-65 mg (EXCEDRIN MIGRAINE) 250-250-65 mg per tablet; Take 2 tablets by mouth every 6 (six) hours as needed for Pain.  Dispense: 40 tablet; Refill: 0      Follow-up with Primary Care as discussed for further refills.     Patient encouraged to monitor symptoms closely and instructed to follow-up for new or worsening symptoms. Further, in-person, evaluation may be necessary for continued treatment. Please follow up with your primary care doctor or specialist as needed. Verbally discussed plan. Patient confirms understanding and is in agreement with treatment and plan.      You must understand that you've received a [facility] Care evaluation only and that you may be released before all your  medical problems are known or treated. You, the patient, will arrange for follow up care as instructed.                         [1]   Current Outpatient Medications on File Prior to Visit   Medication Sig Dispense Refill    dextroamphetamine-amphetamine (ADDERALL XR) 10 MG 24 hr capsule Take 10 mg by mouth every morning. (Patient not taking: Reported on 6/11/2024)      metFORMIN (GLUCOPHAGE-XR) 500 MG ER 24hr tablet Take 1 tablet (500 mg total) by mouth daily with breakfast. 90 tablet 3    methylphenidate HCl (RITALIN) 20 MG tablet Take 1 tablet twice daily 30 tablet 0    methylphenidate HCl (RITALIN) 20 MG tablet Take 1 tablet twice daily 60 tablet 0    methylphenidate HCl (RITALIN) 20 MG tablet Take 1 tablet twice daily 30 tablet 0    sertraline (ZOLOFT) 100 MG tablet Take 1 tablet daily. 30 tablet 3    triamcinolone acetonide 0.1% (KENALOG) 0.1 % cream AAA bid (Patient not taking: Reported on 6/11/2024) 454 g 0    urea (CARMOL) 40 % Crea Apply topically once daily. (Patient not taking: Reported on 6/11/2024) 85 g 2    urea (CARMOL) 40 % Crea Apply topically 2 (two) times daily as directed (Patient not taking: Reported on 6/11/2024) 85 g 0     No current facility-administered medications on file prior to visit.

## 2025-04-04 ENCOUNTER — ON-DEMAND VIRTUAL (OUTPATIENT)
Dept: URGENT CARE | Facility: CLINIC | Age: 30
End: 2025-04-04
Payer: MEDICAID

## 2025-04-04 DIAGNOSIS — J11.1 INFLUENZA-LIKE ILLNESS: Primary | ICD-10-CM

## 2025-04-04 NOTE — PROGRESS NOTES
Subjective:      Patient ID: Ernie Hunter is a 29 y.o. male.    Vitals:  vitals were not taken for this visit.     Chief Complaint: Chills      Visit Type: TELE AUDIOVISUAL    Patient Location: Home [place]    Present with the patient at the time of consultation: TELEMED PRESENT WITH PATIENT: None    Past Medical History:   Diagnosis Date    Attention deficit hyperactivity disorder (ADHD), predominantly inattentive type     Depression     Insomnia     Mild intermittent asthma, uncomplicated     Prediabetes      History reviewed. No pertinent surgical history.  Review of patient's allergies indicates:   Allergen Reactions    Ibuprofen Other (See Comments)     Abdominal pain     Medications Ordered Prior to Encounter[1]  Family History   Problem Relation Name Age of Onset    Hypertension Mother      Migraines Mother      Depression Mother      Anxiety disorder Mother      ADD / ADHD Mother      Hyperlipidemia Father      Hypertension Father      Heart attack Father          prior to 50    Depression Sister      Anxiety disorder Sister      ADD / ADHD Brother      Heart failure Maternal Grandmother      Hypertension Maternal Grandmother      Hyperlipidemia Maternal Grandmother      Diabetes Maternal Grandmother      Diabetes Paternal Grandfather      Hyperlipidemia Paternal Grandfather      Hypertension Paternal Grandfather      Arthritis Paternal Grandfather      Gout Paternal Grandfather             Ohs Peq Odvv Intake    4/4/2025  5:39 PM CDT - Filed by Patient   What is your current physical address in the event of a medical emergency? [address] [place] [id number]   Are you able to take your vital signs? No   Please attach any relevant images or files    Is your employer contracted with [facility]? No         Patient doing virtual for possible flu. Has chills, sweats, body aches, fatigue x 2 days. Also having headaches ongoing x 6 days. Does have some nasal congestion. No  cough.        Constitution: Positive for activity change, appetite change (decreased), chills, sweating and fatigue. Negative for fever.   HENT:  Positive for congestion and postnasal drip.    Respiratory:  Negative for cough.    Musculoskeletal:  Positive for muscle ache.   Neurological:  Positive for headaches.        Objective:   The physical exam was conducted virtually.  Physical Exam   Constitutional: He does not appear ill. No distress.   HENT:   Head: Normocephalic and atraumatic.   Nose: Nose normal.   Eyes: Conjunctivae are normal. Right eye exhibits no discharge. Left eye exhibits no discharge.   Pulmonary/Chest: Effort normal. No stridor. No respiratory distress.   Abdominal: Normal appearance.   Neurological: He is alert and at baseline.   Psychiatric: His behavior is normal. Mood and thought content normal.       Assessment:     1. Influenza-like illness        Plan:       Influenza-like illness    Recommend home flu/covid test  Rest  Drink plenty clear liquids  Tylenol/Ibuprofen for fever, chills, body aches  Warm salt water gargles for throat comfort  The flu is a virus and generally runs its course in about 1 week  If symptoms worsen or fail to improve with treatment, see your Primary Care Provider or go to the nearest Emergency Room.                     [1]   Current Outpatient Medications on File Prior to Visit   Medication Sig Dispense Refill    aspirin-acetaminophen-caffeine 250-250-65 mg (EXCEDRIN MIGRAINE) 250-250-65 mg per tablet Take 2 tablets by mouth every 6 (six) hours as needed for Pain. 40 tablet 0    dextroamphetamine-amphetamine (ADDERALL XR) 10 MG 24 hr capsule Take 10 mg by mouth every morning. (Patient not taking: Reported on 6/11/2024)      metFORMIN (GLUCOPHAGE-XR) 500 MG ER 24hr tablet Take 1 tablet (500 mg total) by mouth daily with breakfast. 90 tablet 3    methylphenidate HCl (RITALIN) 20 MG tablet Take 1 tablet twice daily 30 tablet 0    methylphenidate HCl (RITALIN) 20 MG  tablet Take 1 tablet twice daily 60 tablet 0    methylphenidate HCl (RITALIN) 20 MG tablet Take 1 tablet twice daily 30 tablet 0    sertraline (ZOLOFT) 100 MG tablet Take 1 tablet daily. 30 tablet 3    triamcinolone acetonide 0.1% (KENALOG) 0.1 % cream AAA bid (Patient not taking: Reported on 6/11/2024) 454 g 0    urea (CARMOL) 40 % Crea Apply topically once daily. (Patient not taking: Reported on 6/11/2024) 85 g 2    urea (CARMOL) 40 % Crea Apply topically 2 (two) times daily as directed (Patient not taking: Reported on 6/11/2024) 85 g 0     No current facility-administered medications on file prior to visit.

## 2025-04-25 ENCOUNTER — OFFICE VISIT (OUTPATIENT)
Dept: PSYCHIATRY | Facility: CLINIC | Age: 30
End: 2025-04-25
Payer: MEDICAID

## 2025-04-25 DIAGNOSIS — F33.0 MDD (MAJOR DEPRESSIVE DISORDER), RECURRENT EPISODE, MILD: ICD-10-CM

## 2025-04-25 DIAGNOSIS — F90.9 ATTENTION DEFICIT HYPERACTIVITY DISORDER (ADHD), UNSPECIFIED ADHD TYPE: ICD-10-CM

## 2025-04-25 DIAGNOSIS — F41.1 GENERALIZED ANXIETY DISORDER: Primary | ICD-10-CM

## 2025-04-25 PROCEDURE — 90834 PSYTX W PT 45 MINUTES: CPT | Mod: ,,, | Performed by: SOCIAL WORKER

## 2025-05-04 NOTE — PROGRESS NOTES
Individual Psychotherapy (PhD/LCSW)    4/25/2025    Site:   [facility]         --Via virtual visit with synchronous audio and video.  Patient presented at home, in the state [facility].   Each patient to whom medical services by telemedicine is provided is:  (1) informed of the relationship between the physician and patient and the respective role of any other health care provider with respect to management of the patient; and (2) notified that he or she may decline to receive medical services by telemedicine and may withdraw from such care at any time.    Therapeutic Intervention: Met with patient.  Outpatient - Behavior modifying psychotherapy 45 min - CPT code 54004 and Outpatient - Interactive psychotherapy 45 min - CPT code 36960    Chief complaint/reason for encounter: attention deficit, depression, anxiety, and situational stress     Interval history and content of current session:  Late entry for 4/25/25.   29 year old male patient following up for psychotherapy, to address generalized anxiety, recurrent depression, and attention deficit symptoms. Previously seen on 1/3/25. Patient reporting pressure currently of finals week. Talked about his school work load. Had cancelled two interim therapy appointments. Said he has 2 art assignments due, 1 power point presentation to make, 2 term papers to write, and 1 exam. Said he is also working 3 to 4 days a week. Finally retrieved his car from the repair shop, with a hefty bill attached. Financially strapped, which prevents him from contributing to the grandmother's household where he is staying, though she said he eats very little there, tries to be a unobtrusive as is possible, and is usually away from the house. Nevertheless, he said both his grandmother and since getting his car back has moved his active art projects back to a campus studio, where he can work on them. Stated his father and grandmother just repeatedly criticize him, expect him to contribute  "more, and make no allowance for his school and work obligations. He is looking ahead to move back to his mother, in [place]. He had intentionally sought to leave [place], as the social climate felt oppressive to him and not good opportunity, but now his mother, who has had issues of her own, appears to express more support for him than anywhere else so far. He indicated he foresees no adequate support here in [place] and may have to look at transferring his studies after this term. Grandmother and father have openly pressured him to move out. He does not feel welcome and appears to be able to do no right in their eyes. Patient denied any si/hi, mood swings, psychosis, or substance abuse. Supportive therapy provided. Scheduled to follow up for psychotherapy 6/2/25.       From Diagnostic Interview:  [ History of present illness: Late entry for 8/1/24.  29 year old male patient presented for initial assessment. Patient choosing to transfer from prior therapist, citing concerns over schedule availability. Patient [facility] from June of 2022 to April of 2024. Chief complaint therapeutically of generalized anxiety disorder, some recurrent depressive symptoms, and attention deficit disorder. He denied any suicidal or homicidal ideation, psychosis, cognitive deficit, or substance abuse issues. He stated disturbed family dynamics and difficult parents with mental health issues of their own were major factors in his formative years. Particularly, he described his mother as "well-educated but hot-headed" and said she robbed the patient of his childhood by coercively leaning on him from an early age to essentially parent his younger siblings. His father for his part, the patient said split from his mother early on, prior to the patient being school age, moved away and had only intermittent contact, with the patient and his 4-years-younger sister staying with their mother, " "who reportedly felt more overwhelmed with responsibility than before. He said after she remarried and gave birth to a half-sister for the patient, some 8 years younger than he, later adopted a boy, another 4 years younger than his half-sister, meaning of the 4 siblings from patient to his adopted baby brother, his first sister is 4 years younger than he, his second sister 8 years younger, and baby brother 12 years younger.    ---  Family history of psychiatric illness: both depression and anxiety in both parents and both sets of grandparents.  ---  Social history (marriage, employment, etc.):  Born and raised in [place]. Biological parents were together but  by the time he was 8 years old. Raised thereafter by mother and step-father. Patient was the oldest of 4, with a sister, Meredith, 4 years younger, half-sister Latesha, 8 years younger, and then adopted brother Eber, 12 years younger. From age 8 on, he saw his biological father only on special occasions, not more than a few times a year. He described his childhood as "I had no childhood." Described mother as hot-tempered and demanding of him, conscripting him into many, if not most of the parenting duties for his younger siblings. He said he has and had various interests but was not given opportunity to pursue them growing up. Too busy shuttling his siblings to their various extracurriculars to have time for any clubs or sports of his own. Step-dad  of liver cancer in 2019. Dad and mom's relations remains very difficult. Patient working on relations with his dad but gets frustrated that his dad does not see the patient for himself, instead being quick to say he's "too much like [his] mother." Patient left home as soon as able; parents remain back in [place]. Patient moved in with a paternal aunt here in [place], and his paternal grandmother also stays there. Patient describes that as difficult; largely due to his grandmother " being unreasonable and judgmental and trying to run everyone's life around her. Patient has been working his way slowly through college. Has just finished an associate degree at [employer] and has plans for studies at [employer] this fall. Was out of work for 3 months; just recently working again. Is in process of trying to get an apartment with a roommate to split expenses. Not currently dating.  Worries about his younger siblings, especially the younger two. Said sister Meredith is stable, recent college grad, soon to move to [place] to become an . Sister Latesha is struggling, having just joined the [employer] but with a violent streak, finding herself in trouble. Eber struggling with school rules. ]    Treatment plan:  Target symptoms: depression, anxiety , adjustment  Why chosen therapy is appropriate versus another modality: relevant to diagnosis; patient responsive to this modality.  Outcome monitoring methods:  self-report; observation.  Therapeutic intervention type: insight-oriented psychotherapy; supportive psychotherapy.    Risk parameters:  Patient reports no suicidal ideation  Patient reports no homicidal ideation  Patient reports no self-injurious behavior  Patient reports no violent behavior    Verbal deficits: None    Patient's response to intervention:  The patient's response to intervention is accepting    Progress toward goals and other mental status changes:  The patient's progress toward goals is mixed.    Diagnosis:     ICD-10-CM ICD-9-CM   1. Generalized anxiety disorder  F41.1 300.02   2. MDD (major depressive disorder), recurrent episode, mild  F33.0 296.31   3. Attention deficit hyperactivity disorder (ADHD), unspecified ADHD type  F90.9 314.01         Plan:  individual psychotherapy    Return to clinic: as scheduled    Length of Service (minutes): 48

## 2025-06-02 ENCOUNTER — OFFICE VISIT (OUTPATIENT)
Dept: PSYCHIATRY | Facility: CLINIC | Age: 30
End: 2025-06-02
Payer: MEDICAID

## 2025-06-02 DIAGNOSIS — F41.1 GENERALIZED ANXIETY DISORDER: Primary | ICD-10-CM

## 2025-06-02 DIAGNOSIS — Z63.9 FAMILY DYNAMICS PROBLEM: ICD-10-CM

## 2025-06-02 DIAGNOSIS — F90.9 ATTENTION DEFICIT HYPERACTIVITY DISORDER (ADHD), UNSPECIFIED ADHD TYPE: ICD-10-CM

## 2025-06-02 DIAGNOSIS — F33.0 MDD (MAJOR DEPRESSIVE DISORDER), RECURRENT EPISODE, MILD: ICD-10-CM

## 2025-06-02 PROCEDURE — 90834 PSYTX W PT 45 MINUTES: CPT | Mod: ,,, | Performed by: SOCIAL WORKER

## 2025-06-02 SDOH — SOCIAL DETERMINANTS OF HEALTH (SDOH): PROBLEM RELATED TO PRIMARY SUPPORT GROUP, UNSPECIFIED: Z63.9

## 2025-06-08 NOTE — PROGRESS NOTES
Individual Psychotherapy (PhD/LCSW)    6/2/2025    Site:   [facility]         --Via virtual visit with synchronous audio and video.  Patient presented at home, in the state of [place].   Each patient to whom medical services by telemedicine is provided is:  (1) informed of the relationship between the physician and patient and the respective role of any other health care provider with respect to management of the patient; and (2) notified that he or she may decline to receive medical services by telemedicine and may withdraw from such care at any time.    Therapeutic Intervention: Met with patient.  Outpatient - Behavior modifying psychotherapy 45 min - CPT code 23594 and Outpatient - Interactive psychotherapy 45 min - CPT code 09830    Chief complaint/reason for encounter: attention deficit, depression, anxiety, and situational stress     Interval history and content of current session:    29 year old male patient following up for psychotherapy, to address generalized anxiety, recurrent depression, and attention deficit symptoms. Previously seen on 4/25/25.  Patient is a somewhat older college student with significant family and financial pressures. He reports still residing with his disapproving paternal grandmother. Father described as emotionally no support, either. Mother remains a few states away and expresses support of him but has her own issues, too. Patient endorses seeing nothing helpful for him back where his mother is, where he once lived. Said he cannot see a way to further his education back there. Reported he is functionally not in as tight a situation as previously reported, as he now has his repaired car back and repair paid for. That, however, meant he had to juggle money some, which displeased his family. Said he is coping back staying away from the house as much as humanly possible. Said he does not eat at his grandmothers, other than food he purchases for himself, and yet she is angry he has  "not contributed more to a grocery fund. He said as it is, food he buys for himself is sometimes eaten by someone else. Reports he is officially finished with his sophomore year. Said he has one classed this summer, and it is online. He recounted loss of 2 grandparents in years past, plus 2 great-grandparents he was close to. That plus being present at his step-father's death has put death and loss heavy in his mind. Patient continuing to process his feelings and work on perspective, with the goal of improving his outlook constructively. Patient denied any si/hi, mood swings, psychosis, or substance abuse. Supportive therapy provided. Scheduled with psychotherapy appointment on 9/26/25 but with plans to follow up much sooner, within the month, as fitz hook.       From Diagnostic Interview:  [ History of present illness: Late entry for 8/1/24.  29 year old male patient presented for initial assessment. Patient choosing to transfer from prior therapist, citing concerns over schedule availability. Patient was in psychotherapy with SELENE Peralta, from June of 2022 to April of 2024. Chief complaint therapeutically of generalized anxiety disorder, some recurrent depressive symptoms, and attention deficit disorder. He denied any suicidal or homicidal ideation, psychosis, cognitive deficit, or substance abuse issues. He stated disturbed family dynamics and difficult parents with mental health issues of their own were major factors in his formative years. Particularly, he described his mother as "well-educated but hot-headed" and said she robbed the patient of his childhood by coercively leaning on him from an early age to essentially parent his younger siblings. His father for his part, the patient said split from his mother early on, prior to the patient being school age, moved away and had only intermittent contact, with the patient and his 4-years-younger sister staying with their mother, who reportedly felt more " "overwhelmed with responsibility than before. He said after she remarried and gave birth to a half-sister for the patient, some 8 years younger than he, later adopted a boy, another 4 years younger than his half-sister, meaning of the 4 siblings from patient to his adopted baby brother, his first sister is 4 years younger than he, his second sister 8 years younger, and baby brother 12 years younger.    ---  Family history of psychiatric illness: both depression and anxiety in both parents and both sets of grandparents.  ---  Social history (marriage, employment, etc.):  Born and raised in [place]. Biological parents were together but  by the time he was 8 years old. Raised thereafter by mother and step-father. Patient was the oldest of 4, with a sister, Meredith, 4 years younger, half-sister Latesha, 8 years younger, and then adopted brother Eber, 12 years younger. From age 8 on, he saw his biological father only on special occasions, not more than a few times a year. He described his childhood as "I had no childhood." Described mother as hot-tempered and demanding of him, conscripting him into many, if not most of the parenting duties for his younger siblings. He said he has and had various interests but was not given opportunity to pursue them growing up. Too busy shuttling his siblings to their various extracurriculars to have time for any clubs or sports of his own. Step-dad  of liver cancer in 2019. Dad and mom's relations remains very difficult. Patient working on relations with his dad but gets frustrated that his dad does not see the patient for himself, instead being quick to say he's "too much like [his] mother." Patient left home as soon as able; parents remain back in [place]. Patient moved in with a paternal aunt here in [place], and his paternal grandmother also stays there. Patient describes that as difficult; largely due to his grandmother being unreasonable and " judgmental and trying to run everyone's life around her. Patient has been working his way slowly through college. Has just finished an associate degree at [employer] and has plans for studies at [employer] this fall. Was out of work for 3 months; just recently working again. Is in process of trying to get an apartment with a roommate to split expenses. Not currently dating.  Worries about his younger siblings, especially the younger two. Said sister Meredith is stable, recent college grad, soon to move to [place] to become an . Sister Latesha is struggling, having just joined the [employer] but with a violent streak, finding herself in trouble. Eber struggling with school rules. ]    Treatment plan:  Target symptoms: depression, anxiety , adjustment  Why chosen therapy is appropriate versus another modality: relevant to diagnosis; patient responsive to this modality.  Outcome monitoring methods:  self-report; observation.  Therapeutic intervention type: insight-oriented psychotherapy; supportive psychotherapy.    Risk parameters:  Patient reports no suicidal ideation  Patient reports no homicidal ideation  Patient reports no self-injurious behavior  Patient reports no violent behavior    Verbal deficits: None    Patient's response to intervention:  The patient's response to intervention is accepting    Progress toward goals and other mental status changes:  The patient's progress toward goals is mixed.    Diagnosis:     ICD-10-CM ICD-9-CM   1. Generalized anxiety disorder  F41.1 300.02   2. MDD (major depressive disorder), recurrent episode, mild  F33.0 296.31   3. Attention deficit hyperactivity disorder (ADHD), unspecified ADHD type  F90.9 314.01   4. Family dynamics problem  Z63.9 V61.9         Plan:  individual psychotherapy    Return to clinic: as scheduled; sooner, as able, tba    Length of Service (minutes): 52